# Patient Record
Sex: MALE | Race: WHITE
[De-identification: names, ages, dates, MRNs, and addresses within clinical notes are randomized per-mention and may not be internally consistent; named-entity substitution may affect disease eponyms.]

---

## 2021-12-23 ENCOUNTER — HOSPITAL ENCOUNTER (INPATIENT)
Dept: HOSPITAL 11 - JP.2SS | Age: 81
LOS: 8 days | Discharge: HOME | DRG: 293 | End: 2021-12-31
Attending: INTERNAL MEDICINE | Admitting: INTERNAL MEDICINE
Payer: MEDICARE

## 2021-12-23 DIAGNOSIS — Z79.899: ICD-10-CM

## 2021-12-23 DIAGNOSIS — I25.2: ICD-10-CM

## 2021-12-23 DIAGNOSIS — Z79.82: ICD-10-CM

## 2021-12-23 DIAGNOSIS — N19: ICD-10-CM

## 2021-12-23 DIAGNOSIS — H54.7: ICD-10-CM

## 2021-12-23 DIAGNOSIS — E78.5: ICD-10-CM

## 2021-12-23 DIAGNOSIS — Z89.511: ICD-10-CM

## 2021-12-23 DIAGNOSIS — I11.0: Primary | ICD-10-CM

## 2021-12-23 DIAGNOSIS — E11.9: ICD-10-CM

## 2021-12-23 DIAGNOSIS — Z87.01: ICD-10-CM

## 2021-12-23 DIAGNOSIS — Z20.822: ICD-10-CM

## 2021-12-23 DIAGNOSIS — I50.9: ICD-10-CM

## 2021-12-23 DIAGNOSIS — K59.09: ICD-10-CM

## 2021-12-23 DIAGNOSIS — I25.10: ICD-10-CM

## 2021-12-23 DIAGNOSIS — H91.90: ICD-10-CM

## 2021-12-23 DIAGNOSIS — Z87.891: ICD-10-CM

## 2021-12-23 DIAGNOSIS — I70.202: ICD-10-CM

## 2021-12-23 LAB — SARS-COV-2 RNA RESP QL NAA+PROBE: NEGATIVE

## 2021-12-23 RX ADMIN — GLIPIZIDE SCH: 5 TABLET, FILM COATED, EXTENDED RELEASE ORAL at 22:39

## 2021-12-23 RX ADMIN — GLIPIZIDE SCH MG: 5 TABLET, FILM COATED, EXTENDED RELEASE ORAL at 22:37

## 2021-12-23 NOTE — CRLCR
For Patients:  As a result of the 21st Century Cures Act, medical imaging 

exams and procedure reports are released immediately into your electronic 

medical record.  You may view this report before your referring provider.  

If you have questions, please contact your health care provider.



HISTORY:



Shortness of breath 



COMPARISON:



Chest two views from 10/07/2021 



FINDINGS:



A portable erect AP view of the chest was obtained at 21 29 hours. 



There is new moderate consolidation of the lateral right lower lung 

associated with a new mild right pleural effusion. The findings suggest new 

right lower lobe pneumonia.



There is new mild patchy density in the left lung base which is probably 

atelectasis, although additional pneumonia cannot be excluded.



There is a new tiny left pleural effusion.



The rest of the chest remains clear.



Shallow inspiration results in crowding of lung markings and the heart now 

appears to be mildly enlarged, partially the result of shallow inspiration. 

Again seen are sternal wires from median sternotomy. Again seen is a 

left-sided defibrillator with leads entering the left subclavian vein and 

terminating in the right atrium, right ventricle, and coronary sinus. 



The mediastinum otherwise remains normal in appearance. 



The osseous structures are otherwise normal in appearance for the patient`s 

age.



IMPRESSION:



New moderate consolidation of the right lateral lower lung with new mild 

right pleural effusion, worrisome for new right lower lobe pneumonia. 



Mild patchy infiltrates in the left lateral lower lung along with tiny left 

pleural effusion, probably atelectasis, but cannot exclude additional 

pneumonia. 



New mild cardiomegaly, at least partially the results of shallow 

inspiration.



Dictated by Blu Smith MD @ 12/23/2021 10:23:13 PM



(Electronically Signed)

## 2021-12-24 RX ADMIN — GLIPIZIDE SCH: 5 TABLET, FILM COATED, EXTENDED RELEASE ORAL at 16:18

## 2021-12-24 RX ADMIN — GLIPIZIDE SCH MG: 5 TABLET, FILM COATED, EXTENDED RELEASE ORAL at 07:34

## 2021-12-24 NOTE — PCM.HP.2
H&P History of Present Illness





- General


Date of Service: 12/24/21


Admit Problem/Dx: 


                           Admission Diagnosis/Problem





Admission Diagnosis/Problem      CHF, Congestive heart failure








Source of Information: Patient, Family


History Limitations: Reports: No Limitations





- History of Present Illness


Initial Comments - Free Text/Narative: 





He is having fluid retention gained about 40 pounds in 3 months.  Also has 

shortness of breath and orthopnea.  He has a pacemaker and recent replacement 

with a third wire as he is also in and out of AF


Duration of Symptoms: Reports: Chronic


Worsens with: Reports: Movement


Associated Symptoms: Reports: Shortness of Breath, Weakness


  ** Left Ankle


Pain Score (Numeric/FACES): 8





- Related Data


Allergies/Adverse Reactions: 


                                    Allergies











Allergy/AdvReac Type Severity Reaction Status Date / Time


 


Iodinated Contrast Media Allergy  Nausea Verified 12/23/21 21:01





[Iodinated Contrast Media -     





IV Dye]     











Home Medications: 


                                    Home Meds





Aspirin 325 mg PO DAILY 09/08/16 [History]


amLODIPine [Norvasc] 5 mg PO DAILY 09/08/16 [History]


atenoloL [Atenolol] 25 mg PO DAILY 09/08/16 [History]


glipiZIDE [Glucotrol XL] 10 mg PO BID 09/08/16 [History]


Lactobacillus Rhamnosus GG [Culturelle] 2 cap PO BID  cap 09/13/16 [Rx]


Furosemide [Lasix] 40 mg PO BID 12/23/21 [History]


Pantoprazole Sodium [Protonix] 40 mg PO BID 12/23/21 [History]


Spironolact/Hydrochlorothiazid [Spironolactone-Hctz 25-25 Tab] 1 tab PO DAILY 

12/23/21 [History]











Past Medical History


HEENT History: Reports: Hard of Hearing, Impaired Vision


Cardiovascular History: Reports: Heart Failure, Heart Murmur, Hypertension, MI


Respiratory History: Reports: Pneumonia, Recurrent


Gastrointestinal History: Reports: Chronic Constipation


Musculoskeletal History: Reports: Amputation


Endocrine/Metabolic History: Reports: Diabetes, Type II





- Infectious Disease History


Infectious Disease History: Reports: Chicken Pox, Hepatitis B, Measles, Mumps





- Past Surgical History


Head Surgeries/Procedures: Reports: None


HEENT Surgical History: Reports: Cataract Surgery


Cardiovascular Surgical History: Reports: Coronary Artery Bypass, Pacer, Other 

(See Below)


Other Cardiovascular Surgeries/Procedures: defibrilator


Respiratory Surgical History: Reports: None


GI Surgical History: Reports: None


Endocrine Surgical History: Reports: None


Neurological Surgical History: Reports: Other (See Below)


Other Neurological Surgeries/Procedures: lumber 4-5 "smashed" 1988


Musculoskeletal Surgical History: Reports: Amputation


Other Musculoskeletal Surgeries/Procedures:: left big toe





Social & Family History





- Family History


HEENT: Reports: Cataract, Impaired Vision


Cardiac: Reports: Hypertension, MI


Respiratory: Reports: None


GI: Reports: None


Musculoskeletal: Reports: Arthritis


Neurological: Reports: CVA


Endocrine/Metabolic: Reports: Diabetes, Type I, Diabetes, type II


Oncologic: Reports: Lung





- Tobacco Use


Tobacco Use Status *Q: Former Tobacco User


Years of Tobacco use: 50


Packs/Tins Daily: 1


Used Tobacco, but Quit: Yes


Month/Year Tobacco Last Used: 2009





- Caffeine Use


Caffeine Use: Reports: Coffee, Soda, Tea





- Recreational Drug Use


Recreational Drug Use: No





H&P Review of Systems





- Review of Systems:


Review Of Systems: See Below


General: Reports: Weakness


HEENT: Reports: No Symptoms


Pulmonary: Reports: Shortness of Breath, Cough


Cardiovascular: Reports: Palpitations, Dyspnea on Exertion, Orthopnea


Gastrointestinal: Reports: Constipation


Genitourinary: Reports: No Symptoms


Musculoskeletal: Reports: Leg Pain


Skin: Reports: Other (Pain left heel)


Neurological: Reports: Difficulty Walking, Weakness





Exam





- Exam


Exam: See Below





- Vital Signs


Vital Signs: 


                                Last Vital Signs











Temp  97.7 F   12/24/21 11:00


 


Pulse  104 H  12/24/21 11:00


 


Resp  18   12/24/21 11:00


 


BP  115/72   12/24/21 11:00


 


Pulse Ox  100   12/24/21 11:00











Weight: 198 lb 3.129 oz





- Exam


General: Alert, Oriented, Cooperative, Moderate Distress


HEENT: PERRLA, EACs Clear, Hearing Intact


Neck: Supple


Lungs: Decreased Breath Sounds, Rales


Cardiovascular: Regular Rate


GI/Abdominal Exam: Distended, Tender


Extremities: Other (BKA right red skin left leg without a papable pulse.)


Peripheral Pulses: 1+: Radial (L), Radial (R), Femoral (L), Femoral (R)


Skin: Warm


Neurological: Cranial Nerves Intact


Neuro Extensive - Mental Status: Alert, Oriented x3


DTR: 1+: Bicep (L), Bicep (R)


Psychiatric: Alert, Normal Affect





- Patient Data


Lab Results Last 24 hrs: 


                         Laboratory Results - last 24 hr











  12/23/21 12/23/21 12/23/21 Range/Units





  20:36 20:41 21:05 


 


WBC    5.5  (4.5-11.0)  K/uL


 


RBC    5.66  (4.30-5.90)  M/uL


 


Hgb    14.9  (12.0-15.0)  g/dL


 


Hct    47.2  (40.0-54.0)  %


 


MCV    83  (80-98)  fL


 


MCH    26 L  (27-31)  pg


 


MCHC    32  (32-36)  %


 


Plt Count    134 L  (150-400)  K/uL


 


Neut % (Auto)    78.8 H  (36-66)  %


 


Lymph % (Auto)    10.5 L  (24-44)  %


 


Mono % (Auto)    10.1 H  (2-6)  %


 


Eos % (Auto)    0.4 L  (2-4)  %


 


Baso % (Auto)    0.2  (0-1)  %


 


Sodium     (140-148)  mmol/L


 


Potassium     (3.6-5.2)  mmol/L


 


Chloride     (100-108)  mmol/L


 


Carbon Dioxide     (21-32)  mmol/L


 


Anion Gap     (5.0-14.0)  mmol/L


 


BUN     (7-18)  mg/dL


 


Creatinine     (0.8-1.3)  mg/dL


 


Est Cr Clr Drug Dosing     mL/min


 


Estimated GFR (MDRD)     (>60)  


 


Glucose     ()  mg/dL


 


POC Glucose     ()  mg/dL


 


Calcium     (8.5-10.1)  mg/dL


 


Total Bilirubin     (0.2-1.0)  mg/dL


 


AST     (15-37)  U/L


 


ALT     (12-78)  U/L


 


Alkaline Phosphatase     ()  U/L


 


NT-Pro-B Natriuret Pep     (5-450)  pg/mL


 


Total Protein     (6.4-8.2)  g/dL


 


Albumin     (3.4-5.0)  g/dL


 


Globulin     (2.3-3.5)  g/dL


 


Albumin/Globulin Ratio     (1.2-2.2)  


 


Urine Color   Yellow   (YELLOW)  


 


Urine Appearance   Clear   (CLEAR)  


 


Urine pH   5.0   (5.0-8.0)  


 


Ur Specific Gravity   1.020   (1.008-1.030)  


 


Urine Protein   Negative   (NEGATIVE)  mg/dL


 


Urine Glucose (UA)   Negative   (NEGATIVE)  mg/dL


 


Urine Ketones   Negative   (NEGATIVE)  mg/dL


 


Urine Occult Blood   Trace-lysed H   (NEGATIVE)  


 


Urine Nitrite   Negative   (NEGATIVE)  


 


Urine Bilirubin   Negative   (NEGATIVE)  


 


Urine Urobilinogen   0.2   (0.2-1.0)  EU/dL


 


Ur Leukocyte Esterase   Negative   (NEGATIVE)  


 


Urine RBC   0-5   (0-5)  


 


Urine WBC   0-5   (0-5)  


 


Ur Epithelial Cells   Rare   


 


Amorphous Sediment   Not seen   


 


Urine Bacteria   Few   


 


Urine Mucus   Few   


 


Urine Other      


 


Influenza Type A RNA  Negative    (NEGATIVE)  


 


RSV RNA (INAAT)  Negative    (NEGATIVE)  


 


Influenza Type B RNA  Negative    (NEGATIVE)  


 


SARS-CoV-2 RNA (STACY)  Negative    (NEGATIVE)  














  12/23/21 12/24/21 12/24/21 Range/Units





  21:05 04:09 13:47 


 


WBC     (4.5-11.0)  K/uL


 


RBC     (4.30-5.90)  M/uL


 


Hgb     (12.0-15.0)  g/dL


 


Hct     (40.0-54.0)  %


 


MCV     (80-98)  fL


 


MCH     (27-31)  pg


 


MCHC     (32-36)  %


 


Plt Count     (150-400)  K/uL


 


Neut % (Auto)     (36-66)  %


 


Lymph % (Auto)     (24-44)  %


 


Mono % (Auto)     (2-6)  %


 


Eos % (Auto)     (2-4)  %


 


Baso % (Auto)     (0-1)  %


 


Sodium  143  145   (140-148)  mmol/L


 


Potassium  4.1  3.8   (3.6-5.2)  mmol/L


 


Chloride  107  107   (100-108)  mmol/L


 


Carbon Dioxide  23  27   (21-32)  mmol/L


 


Anion Gap  12.7  10.8   (5.0-14.0)  mmol/L


 


BUN  18  17   (7-18)  mg/dL


 


Creatinine  1.4 H  1.4 H   (0.8-1.3)  mg/dL


 


Est Cr Clr Drug Dosing  43.45  43.45   mL/min


 


Estimated GFR (MDRD)  49 L  49 L   (>60)  


 


Glucose  136 H  110 H   ()  mg/dL


 


POC Glucose    97  ()  mg/dL


 


Calcium  8.9  8.7   (8.5-10.1)  mg/dL


 


Total Bilirubin  1.1 H D    (0.2-1.0)  mg/dL


 


AST  24    (15-37)  U/L


 


ALT  21    (12-78)  U/L


 


Alkaline Phosphatase  85    ()  U/L


 


NT-Pro-B Natriuret Pep  35913 H    (5-450)  pg/mL


 


Total Protein  6.4    (6.4-8.2)  g/dL


 


Albumin  3.4    (3.4-5.0)  g/dL


 


Globulin  3.0    (2.3-3.5)  g/dL


 


Albumin/Globulin Ratio  1.1 L    (1.2-2.2)  


 


Urine Color     (YELLOW)  


 


Urine Appearance     (CLEAR)  


 


Urine pH     (5.0-8.0)  


 


Ur Specific Gravity     (1.008-1.030)  


 


Urine Protein     (NEGATIVE)  mg/dL


 


Urine Glucose (UA)     (NEGATIVE)  mg/dL


 


Urine Ketones     (NEGATIVE)  mg/dL


 


Urine Occult Blood     (NEGATIVE)  


 


Urine Nitrite     (NEGATIVE)  


 


Urine Bilirubin     (NEGATIVE)  


 


Urine Urobilinogen     (0.2-1.0)  EU/dL


 


Ur Leukocyte Esterase     (NEGATIVE)  


 


Urine RBC     (0-5)  


 


Urine WBC     (0-5)  


 


Ur Epithelial Cells     


 


Amorphous Sediment     


 


Urine Bacteria     


 


Urine Mucus     


 


Urine Other     


 


Influenza Type A RNA     (NEGATIVE)  


 


RSV RNA (INAAT)     (NEGATIVE)  


 


Influenza Type B RNA     (NEGATIVE)  


 


SARS-CoV-2 RNA (STACY)     (NEGATIVE)  











Result Diagrams: 


                                 12/23/21 21:05





                                 12/24/21 04:09





Sepsis Event Note





- Evaluation


Sepsis Screening Result: No Definite Risk





- Focused Exam


Vital Signs: 


                                   Vital Signs











  Temp Pulse Pulse Resp BP BP Pulse Ox


 


 12/24/21 11:00  97.7 F   104 H  18   115/72  100


 


 12/24/21 10:35   106 H    109/65  


 


 12/24/21 07:34  96.9 F   98  18   119/65  97


 


 12/24/21 03:00    93  18   104/77  98











Problem List Initiated/Reviewed/Updated: Yes


Orders Last 24hrs: 


                               Active Orders 24 hr











 Category Date Time Status


 


 Patient Status [ADT] Routine ADT  12/23/21 20:21 Active


 


 Communication Order [RC] ASDIRECTED Care  12/23/21 23:49 Active


 


 Raygoza Catheter Insertion [Insert Urinary Catheter] [OM. Care  12/23/21 21:15 

Ordered





 PC] Q24H   


 


 POC Glucose [Blood Glucose Check, Bedside] [RC] Care  12/24/21 10:20 Active





 WITHMEALSANDBED   


 


 Telemetry Monitoring [Cardiac Monitoring] [RC] .As Care  12/23/21 22:38 Active





 Directed   


 


 Urinary Catheter Assessment [RC] ASDIRECTED Care  12/23/21 21:15 Active


 


 Consistent Carbohydrate Diet [DIET] Diet  12/24/21 Breakfast Active


 


 CULTURE RESPIRATORY + SMEAR [RM] Routine Lab  12/23/21 23:49 Ordered


 


 GLUCOSE POC LAB TO COLLECT JPM [POC] QIDACANDBED Lab  12/24/21 16:30 Ordered


 


 GLUCOSE POC LAB TO COLLECT JPM [POC] QIDACANDBED Lab  12/24/21 21:00 Ordered


 


 GLUCOSE POC LAB TO COLLECT JPM [POC] QIDACANDBED Lab  12/25/21 07:30 Ordered


 


 GLUCOSE POC LAB TO COLLECT JPM [POC] QIDACANDBED Lab  12/25/21 11:30 Ordered


 


 GLUCOSE POC LAB TO COLLECT JPM [POC] QIDACANDBED Lab  12/25/21 16:30 Ordered


 


 GLUCOSE POC LAB TO COLLECT JPM [POC] QIDACANDBED Lab  12/25/21 21:00 Ordered


 


 GLUCOSE POC LAB TO COLLECT JPM [POC] QIDACANDBED Lab  12/26/21 07:30 Ordered


 


 GLUCOSE POC LAB TO COLLECT JPM [POC] QIDACANDBED Lab  12/26/21 11:30 Ordered


 


 GLUCOSE POC LAB TO COLLECT JPM [POC] QIDACANDBED Lab  12/26/21 16:30 Ordered


 


 GLUCOSE POC LAB TO COLLECT JPM [POC] QIDACANDBED Lab  12/26/21 21:00 Ordered


 


 GLUCOSE POC LAB TO COLLECT JPM [POC] QIDACANDBED Lab  12/27/21 07:30 Ordered


 


 GLUCOSE POC LAB TO COLLECT JPM [POC] QIDACANDBED Lab  12/27/21 11:30 Ordered


 


 GLUCOSE POC LAB TO COLLECT JPM [POC] QIDACANDBED Lab  12/27/21 16:30 Ordered


 


 GLUCOSE POC LAB TO COLLECT JPM [POC] QIDACANDBED Lab  12/27/21 21:00 Ordered


 


 GLUCOSE POC LAB TO COLLECT JPM [POC] QIDACANDBED Lab  12/28/21 07:30 Ordered


 


 GLUCOSE POC LAB TO COLLECT JPM [POC] QIDACANDBED Lab  12/28/21 11:30 Ordered


 


 Aspirin [Halfprin] Med  12/24/21 09:00 Active





 81 mg PO DAILY   


 


 Furosemide [Lasix] Med  12/24/21 12:00 Active





 20 mg IVPUSH Q6H   


 


 Pantoprazole [ProTONIX***] Med  12/24/21 07:30 Active





 40 mg PO BIDAC   


 


 Sodium Chloride 0.9% [Saline Flush] Med  12/23/21 20:41 Active





 10 ml FLUSH ASDIRECTED PRN   


 


 Spironolactone [Aldactone] Med  12/23/21 22:15 Active





 25 mg PO DAILY   


 


 atenoloL [Tenormin] Med  12/24/21 10:30 Active





 25 mg PO DAILY   


 


 diphenhydrAMINE [Benadryl] Med  12/24/21 20:00 Once





 25 mg IVPUSH ONETIME ONE   


 


 diphenhydrAMINE [Benadryl] Med  12/25/21 08:00 Once





 25 mg IVPUSH ONETIME ONE   


 


 glipiZIDE [Glucotrol XL] Med  12/23/21 22:00 Active





 10 mg PO BIDMEALS   


 


 hydroCHLOROthiazide Med  12/23/21 22:15 Active





 25 mg PO DAILY   


 


 methylPREDNISolone Sod Succ [Solu-MEDROL] Med  12/24/21 20:00 Once





 80 mg IVPUSH ONETIME ONE   


 


 methylPREDNISolone Sod Succ [Solu-MEDROL] Med  12/25/21 08:00 Once





 80 mg IVPUSH ONETIME ONE   


 


 traMADol [Ultram] Med  12/24/21 10:21 Active





 50 mg PO Q6H PRN   


 


 Saline Lock Insert [OM.PC] Routine Oth  12/23/21 20:41 Ordered


 


 EKG 12 Lead [EK] Routine Ther  12/23/21 20:41 Ordered








                                Medication Orders





Aspirin (Aspirin 81 Mg Tab.Ec)  81 mg PO DAILY [facility]


   Last Admin: 12/24/21 08:26  Dose: 81 mg


   Documented by: BRIGHT


Atenolol (Atenolol 25 Mg Tab)  25 mg PO DAILY [facility]


   Last Admin: 12/24/21 10:35  Dose: 25 mg


   Documented by: BRIGHT


Diphenhydramine HCl (Diphenhydramine 50 Mg/Ml Sdv)  25 mg IVPUSH ONETIME ONE


   Stop: 12/24/21 20:01


Diphenhydramine HCl (Diphenhydramine 50 Mg/Ml Sdv)  25 mg IVPUSH ONETIME ONE


   Stop: 12/25/21 08:01


Furosemide (Furosemide 20 Mg/2 Ml Vial)  20 mg IVPUSH Q6H [facility]


   Last Admin: 12/24/21 13:19  Dose: 20 mg


   Documented by: BRIGHT


Glipizide (Glipizide 5 Mg Tab.Er)  10 mg PO BIDMEALS [facility]


   Last Admin: 12/24/21 07:34  Dose: 10 mg


   Documented by: BRIGHT


   Admin: 12/23/21 22:39 Dose:  Not Given


   Documented by: MANPREET


Hydrochlorothiazide (Hydrochlorothiazide 25 Mg Tab)  25 mg PO DAILY [facility]


   Last Admin: 12/24/21 08:26  Dose: 25 mg


   Documented by: BRIGHT


   Admin: 12/23/21 22:37  Dose: 25 mg


   Documented by: MANPREET


Methylprednisolone Sodium Succinate (Methylprednisolone Sodium Succinate 40 Mg/1

 Ml Sdv)  80 mg IVPUSH ONETIME ONE


   Stop: 12/24/21 20:01


Methylprednisolone Sodium Succinate (Methylprednisolone Sodium Succinate 40 Mg/1

 Ml Sdv)  80 mg IVPUSH ONETIME ONE


   Stop: 12/25/21 08:01


Pantoprazole Sodium (Pantoprazole 40 Mg Tab.Cr)  40 mg PO BIDAC [facility]


   Last Admin: 12/24/21 07:45  Dose: 40 mg


   Documented by: BRIGHT


Sodium Chloride (Sodium Chloride 0.9% 10 Ml Syringe)  10 ml FLUSH ASDIRECTED PRN


   PRN Reason: Keep Vein Open


Spironolactone (Spironolactone 25 Mg Tab)  25 mg PO DAILY [facility]


   Last Admin: 12/24/21 08:27  Dose: 25 mg


   Documented by: BRIGHT


   Admin: 12/23/21 22:37  Dose: 25 mg


   Documented by: MANPREET


Tramadol HCl (Tramadol 50 Mg Tab)  50 mg PO Q6H PRN


   PRN Reason: Pain


   Last Admin: 12/24/21 10:35  Dose: 50 mg


   Documented by: BRIGHT








Assessment/Plan Comment:: 





Assessment/Plan:  #1.  CHF with fluid retention.  Have started of IV Lasix and 

Zaroxolyn and HCTZ and Aldactazide.  Will have the pacemaker checked in the 

morning.  Will schedule a echocardiogram when possible.





#2.  DMII.  Will adjust and treat blood sugars





#3.  Arthrosclerosis arteries diffuse.  Will check arterial flow in the left 

leg.





#4.  ASHD:  Chronic





#5.  BKA right leg.]





#6.  History of HTN:





#7.  History of HLD











- Mortality Measure


Prognosis:: Poor

## 2021-12-24 NOTE — US
VL Duplex Lwr Ext Art Ltd Lt

 

CLINICAL HISTORY: Left leg pain and swelling

 

FINDINGS: Real-time Doppler images were obtained to left lower extremity from

the groin to the foot

Monophasic waveforms are seen from the common femoral artery downward through

the superficial femoral artery popliteal artery and the anterior and posterior

tibial arteries. Monophasic reduced flow is seen in the dorsal pedal artery.

Distal arteries were very calcified

 

 

IMPRESSION: Monophasic waveforms through the left lower extremity consistent

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

 

Diffuse arterial calcification consistent with patient's history of diabetes

## 2021-12-24 NOTE — PCM.PN
- General Info


Date of Service: 12/24/21


Functional Status: Reports: Pain Controlled





- Review of Systems


General: Reports: Weakness


Pulmonary: Reports: Shortness of Breath


Cardiovascular: Reports: No Symptoms


Gastrointestinal: Reports: Constipation


Genitourinary: Reports: No Symptoms


Skin: Reports: Other (pain left heel)


Psychiatric: Reports: No Symptoms





- Patient Data


Vitals - Most Recent: 


                                Last Vital Signs











Temp  97.7 F   12/24/21 11:00


 


Pulse  104 H  12/24/21 11:00


 


Resp  18   12/24/21 11:00


 


BP  115/72   12/24/21 11:00


 


Pulse Ox  100   12/24/21 11:00











Weight - Most Recent: 198 lb 3.129 oz


I&O - Last 24 Hours: 


                                 Intake & Output











 12/23/21 12/24/21 12/24/21





 22:59 06:59 14:59


 


Intake Total   540


 


Output Total  1550 575


 


Balance  -1550 -35











Lab Results Last 24 Hours: 


                         Laboratory Results - last 24 hr











  12/23/21 12/23/21 12/23/21 Range/Units





  20:36 20:41 21:05 


 


WBC    5.5  (4.5-11.0)  K/uL


 


RBC    5.66  (4.30-5.90)  M/uL


 


Hgb    14.9  (12.0-15.0)  g/dL


 


Hct    47.2  (40.0-54.0)  %


 


MCV    83  (80-98)  fL


 


MCH    26 L  (27-31)  pg


 


MCHC    32  (32-36)  %


 


Plt Count    134 L  (150-400)  K/uL


 


Neut % (Auto)    78.8 H  (36-66)  %


 


Lymph % (Auto)    10.5 L  (24-44)  %


 


Mono % (Auto)    10.1 H  (2-6)  %


 


Eos % (Auto)    0.4 L  (2-4)  %


 


Baso % (Auto)    0.2  (0-1)  %


 


Sodium     (140-148)  mmol/L


 


Potassium     (3.6-5.2)  mmol/L


 


Chloride     (100-108)  mmol/L


 


Carbon Dioxide     (21-32)  mmol/L


 


Anion Gap     (5.0-14.0)  mmol/L


 


BUN     (7-18)  mg/dL


 


Creatinine     (0.8-1.3)  mg/dL


 


Est Cr Clr Drug Dosing     mL/min


 


Estimated GFR (MDRD)     (>60)  


 


Glucose     ()  mg/dL


 


POC Glucose     ()  mg/dL


 


Calcium     (8.5-10.1)  mg/dL


 


Total Bilirubin     (0.2-1.0)  mg/dL


 


AST     (15-37)  U/L


 


ALT     (12-78)  U/L


 


Alkaline Phosphatase     ()  U/L


 


NT-Pro-B Natriuret Pep     (5-450)  pg/mL


 


Total Protein     (6.4-8.2)  g/dL


 


Albumin     (3.4-5.0)  g/dL


 


Globulin     (2.3-3.5)  g/dL


 


Albumin/Globulin Ratio     (1.2-2.2)  


 


Urine Color   Yellow   (YELLOW)  


 


Urine Appearance   Clear   (CLEAR)  


 


Urine pH   5.0   (5.0-8.0)  


 


Ur Specific Gravity   1.020   (1.008-1.030)  


 


Urine Protein   Negative   (NEGATIVE)  mg/dL


 


Urine Glucose (UA)   Negative   (NEGATIVE)  mg/dL


 


Urine Ketones   Negative   (NEGATIVE)  mg/dL


 


Urine Occult Blood   Trace-lysed H   (NEGATIVE)  


 


Urine Nitrite   Negative   (NEGATIVE)  


 


Urine Bilirubin   Negative   (NEGATIVE)  


 


Urine Urobilinogen   0.2   (0.2-1.0)  EU/dL


 


Ur Leukocyte Esterase   Negative   (NEGATIVE)  


 


Urine RBC   0-5   (0-5)  


 


Urine WBC   0-5   (0-5)  


 


Ur Epithelial Cells   Rare   


 


Amorphous Sediment   Not seen   


 


Urine Bacteria   Few   


 


Urine Mucus   Few   


 


Urine Other      


 


Influenza Type A RNA  Negative    (NEGATIVE)  


 


RSV RNA (INAAT)  Negative    (NEGATIVE)  


 


Influenza Type B RNA  Negative    (NEGATIVE)  


 


SARS-CoV-2 RNA (STACY)  Negative    (NEGATIVE)  














  12/23/21 12/24/21 12/24/21 Range/Units





  21:05 04:09 13:47 


 


WBC     (4.5-11.0)  K/uL


 


RBC     (4.30-5.90)  M/uL


 


Hgb     (12.0-15.0)  g/dL


 


Hct     (40.0-54.0)  %


 


MCV     (80-98)  fL


 


MCH     (27-31)  pg


 


MCHC     (32-36)  %


 


Plt Count     (150-400)  K/uL


 


Neut % (Auto)     (36-66)  %


 


Lymph % (Auto)     (24-44)  %


 


Mono % (Auto)     (2-6)  %


 


Eos % (Auto)     (2-4)  %


 


Baso % (Auto)     (0-1)  %


 


Sodium  143  145   (140-148)  mmol/L


 


Potassium  4.1  3.8   (3.6-5.2)  mmol/L


 


Chloride  107  107   (100-108)  mmol/L


 


Carbon Dioxide  23  27   (21-32)  mmol/L


 


Anion Gap  12.7  10.8   (5.0-14.0)  mmol/L


 


BUN  18  17   (7-18)  mg/dL


 


Creatinine  1.4 H  1.4 H   (0.8-1.3)  mg/dL


 


Est Cr Clr Drug Dosing  43.45  43.45   mL/min


 


Estimated GFR (MDRD)  49 L  49 L   (>60)  


 


Glucose  136 H  110 H   ()  mg/dL


 


POC Glucose    97  ()  mg/dL


 


Calcium  8.9  8.7   (8.5-10.1)  mg/dL


 


Total Bilirubin  1.1 H D    (0.2-1.0)  mg/dL


 


AST  24    (15-37)  U/L


 


ALT  21    (12-78)  U/L


 


Alkaline Phosphatase  85    ()  U/L


 


NT-Pro-B Natriuret Pep  64503 H    (5-450)  pg/mL


 


Total Protein  6.4    (6.4-8.2)  g/dL


 


Albumin  3.4    (3.4-5.0)  g/dL


 


Globulin  3.0    (2.3-3.5)  g/dL


 


Albumin/Globulin Ratio  1.1 L    (1.2-2.2)  


 


Urine Color     (YELLOW)  


 


Urine Appearance     (CLEAR)  


 


Urine pH     (5.0-8.0)  


 


Ur Specific Gravity     (1.008-1.030)  


 


Urine Protein     (NEGATIVE)  mg/dL


 


Urine Glucose (UA)     (NEGATIVE)  mg/dL


 


Urine Ketones     (NEGATIVE)  mg/dL


 


Urine Occult Blood     (NEGATIVE)  


 


Urine Nitrite     (NEGATIVE)  


 


Urine Bilirubin     (NEGATIVE)  


 


Urine Urobilinogen     (0.2-1.0)  EU/dL


 


Ur Leukocyte Esterase     (NEGATIVE)  


 


Urine RBC     (0-5)  


 


Urine WBC     (0-5)  


 


Ur Epithelial Cells     


 


Amorphous Sediment     


 


Urine Bacteria     


 


Urine Mucus     


 


Urine Other     


 


Influenza Type A RNA     (NEGATIVE)  


 


RSV RNA (INAAT)     (NEGATIVE)  


 


Influenza Type B RNA     (NEGATIVE)  


 


SARS-CoV-2 RNA (STACY)     (NEGATIVE)  











Med Orders - Current: 


                               Current Medications





Aspirin (Aspirin 81 Mg Tab.Ec)  81 mg PO DAILY [facility]


   Last Admin: 12/24/21 08:26 Dose:  81 mg


   Documented by: 


Atenolol (Atenolol 25 Mg Tab)  25 mg PO DAILY [facility]


   Last Admin: 12/24/21 10:35 Dose:  25 mg


   Documented by: 


Diphenhydramine HCl (Diphenhydramine 50 Mg/Ml Sdv)  25 mg IVPUSH ONETIME ONE


   Stop: 12/24/21 20:01


Diphenhydramine HCl (Diphenhydramine 50 Mg/Ml Sdv)  25 mg IVPUSH ONETIME ONE


   Stop: 12/25/21 08:01


Furosemide (Furosemide 20 Mg/2 Ml Vial)  20 mg IVPUSH Q6H [facility]


   Last Admin: 12/24/21 13:19 Dose:  20 mg


   Documented by: 


Glipizide (Glipizide 5 Mg Tab.Er)  10 mg PO BIDMEALS [facility]


   Last Admin: 12/24/21 07:34 Dose:  10 mg


   Documented by: 


Hydrochlorothiazide (Hydrochlorothiazide 25 Mg Tab)  25 mg PO DAILY [facility]


   Last Admin: 12/24/21 08:26 Dose:  25 mg


   Documented by: 


Methylprednisolone Sodium Succinate (Methylprednisolone Sodium Succinate 40 Mg/1

Ml Sdv)  80 mg IVPUSH ONETIME ONE


   Stop: 12/24/21 20:01


Methylprednisolone Sodium Succinate (Methylprednisolone Sodium Succinate 40 Mg/1

Ml Sdv)  80 mg IVPUSH ONETIME ONE


   Stop: 12/25/21 08:01


Pantoprazole Sodium (Pantoprazole 40 Mg Tab.Cr)  40 mg PO BIDAC [facility]


   Last Admin: 12/24/21 07:45 Dose:  40 mg


   Documented by: 


Sodium Chloride (Sodium Chloride 0.9% 10 Ml Syringe)  10 ml FLUSH ASDIRECTED PRN


   PRN Reason: Keep Vein Open


Spironolactone (Spironolactone 25 Mg Tab)  25 mg PO DAILY [facility]


   Last Admin: 12/24/21 08:27 Dose:  25 mg


   Documented by: 


Tramadol HCl (Tramadol 50 Mg Tab)  50 mg PO Q6H PRN


   PRN Reason: Pain


   Last Admin: 12/24/21 10:35 Dose:  50 mg


   Documented by: 





Discontinued Medications





Furosemide (Furosemide 20 Mg/2 Ml Vial)  20 mg IVPUSH ONETIME ONE


   Stop: 12/23/21 21:11


   Last Admin: 12/23/21 22:01 Dose:  20 mg


   Documented by: 


Lidocaine HCl (Lidocaine 2% Jelly 10 Ml Urojet)  10 ml MUCMEM ONETIME ONE


   Stop: 12/23/21 21:48


   Last Admin: 12/23/21 22:01 Dose:  10 ml


   Documented by: 


Metolazone (Metolazone 2.5 Mg Tab)  5 mg PO ONETIME ONE


   Stop: 12/23/21 21:49


   Last Admin: 12/23/21 22:37 Dose:  5 mg


   Documented by: 











- Exam


Urinary Catheter Total Time: 0Days  0Hours


General: Alert, Oriented


HEENT: Pupils Equal, Pupils Reactive, EOMI, Mucous Membr. Moist/Pink


Neck: Supple


Lungs: Normal Respiratory Effort, Crackles, Rales


Cardiovascular: Regular Rate, Regular Rhythm


GI/Abdominal Exam: Distended


Back Exam: Normal Inspection, Full Range of Motion


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


Skin: Warm


Psy/Mental Status: Alert, Normal Affect, Normal Mood





- Patient Data


Lab Results Last 24 hrs: 


                         Laboratory Results - last 24 hr











  12/23/21 12/23/21 12/23/21 Range/Units





  20:36 20:41 21:05 


 


WBC    5.5  (4.5-11.0)  K/uL


 


RBC    5.66  (4.30-5.90)  M/uL


 


Hgb    14.9  (12.0-15.0)  g/dL


 


Hct    47.2  (40.0-54.0)  %


 


MCV    83  (80-98)  fL


 


MCH    26 L  (27-31)  pg


 


MCHC    32  (32-36)  %


 


Plt Count    134 L  (150-400)  K/uL


 


Neut % (Auto)    78.8 H  (36-66)  %


 


Lymph % (Auto)    10.5 L  (24-44)  %


 


Mono % (Auto)    10.1 H  (2-6)  %


 


Eos % (Auto)    0.4 L  (2-4)  %


 


Baso % (Auto)    0.2  (0-1)  %


 


Sodium     (140-148)  mmol/L


 


Potassium     (3.6-5.2)  mmol/L


 


Chloride     (100-108)  mmol/L


 


Carbon Dioxide     (21-32)  mmol/L


 


Anion Gap     (5.0-14.0)  mmol/L


 


BUN     (7-18)  mg/dL


 


Creatinine     (0.8-1.3)  mg/dL


 


Est Cr Clr Drug Dosing     mL/min


 


Estimated GFR (MDRD)     (>60)  


 


Glucose     ()  mg/dL


 


POC Glucose     ()  mg/dL


 


Calcium     (8.5-10.1)  mg/dL


 


Total Bilirubin     (0.2-1.0)  mg/dL


 


AST     (15-37)  U/L


 


ALT     (12-78)  U/L


 


Alkaline Phosphatase     ()  U/L


 


NT-Pro-B Natriuret Pep     (5-450)  pg/mL


 


Total Protein     (6.4-8.2)  g/dL


 


Albumin     (3.4-5.0)  g/dL


 


Globulin     (2.3-3.5)  g/dL


 


Albumin/Globulin Ratio     (1.2-2.2)  


 


Urine Color   Yellow   (YELLOW)  


 


Urine Appearance   Clear   (CLEAR)  


 


Urine pH   5.0   (5.0-8.0)  


 


Ur Specific Gravity   1.020   (1.008-1.030)  


 


Urine Protein   Negative   (NEGATIVE)  mg/dL


 


Urine Glucose (UA)   Negative   (NEGATIVE)  mg/dL


 


Urine Ketones   Negative   (NEGATIVE)  mg/dL


 


Urine Occult Blood   Trace-lysed H   (NEGATIVE)  


 


Urine Nitrite   Negative   (NEGATIVE)  


 


Urine Bilirubin   Negative   (NEGATIVE)  


 


Urine Urobilinogen   0.2   (0.2-1.0)  EU/dL


 


Ur Leukocyte Esterase   Negative   (NEGATIVE)  


 


Urine RBC   0-5   (0-5)  


 


Urine WBC   0-5   (0-5)  


 


Ur Epithelial Cells   Rare   


 


Amorphous Sediment   Not seen   


 


Urine Bacteria   Few   


 


Urine Mucus   Few   


 


Urine Other      


 


Influenza Type A RNA  Negative    (NEGATIVE)  


 


RSV RNA (INAAT)  Negative    (NEGATIVE)  


 


Influenza Type B RNA  Negative    (NEGATIVE)  


 


SARS-CoV-2 RNA (STACY)  Negative    (NEGATIVE)  














  12/23/21 12/24/21 12/24/21 Range/Units





  21:05 04:09 13:47 


 


WBC     (4.5-11.0)  K/uL


 


RBC     (4.30-5.90)  M/uL


 


Hgb     (12.0-15.0)  g/dL


 


Hct     (40.0-54.0)  %


 


MCV     (80-98)  fL


 


MCH     (27-31)  pg


 


MCHC     (32-36)  %


 


Plt Count     (150-400)  K/uL


 


Neut % (Auto)     (36-66)  %


 


Lymph % (Auto)     (24-44)  %


 


Mono % (Auto)     (2-6)  %


 


Eos % (Auto)     (2-4)  %


 


Baso % (Auto)     (0-1)  %


 


Sodium  143  145   (140-148)  mmol/L


 


Potassium  4.1  3.8   (3.6-5.2)  mmol/L


 


Chloride  107  107   (100-108)  mmol/L


 


Carbon Dioxide  23  27   (21-32)  mmol/L


 


Anion Gap  12.7  10.8   (5.0-14.0)  mmol/L


 


BUN  18  17   (7-18)  mg/dL


 


Creatinine  1.4 H  1.4 H   (0.8-1.3)  mg/dL


 


Est Cr Clr Drug Dosing  43.45  43.45   mL/min


 


Estimated GFR (MDRD)  49 L  49 L   (>60)  


 


Glucose  136 H  110 H   ()  mg/dL


 


POC Glucose    97  ()  mg/dL


 


Calcium  8.9  8.7   (8.5-10.1)  mg/dL


 


Total Bilirubin  1.1 H D    (0.2-1.0)  mg/dL


 


AST  24    (15-37)  U/L


 


ALT  21    (12-78)  U/L


 


Alkaline Phosphatase  85    ()  U/L


 


NT-Pro-B Natriuret Pep  44967 H    (5-450)  pg/mL


 


Total Protein  6.4    (6.4-8.2)  g/dL


 


Albumin  3.4    (3.4-5.0)  g/dL


 


Globulin  3.0    (2.3-3.5)  g/dL


 


Albumin/Globulin Ratio  1.1 L    (1.2-2.2)  


 


Urine Color     (YELLOW)  


 


Urine Appearance     (CLEAR)  


 


Urine pH     (5.0-8.0)  


 


Ur Specific Gravity     (1.008-1.030)  


 


Urine Protein     (NEGATIVE)  mg/dL


 


Urine Glucose (UA)     (NEGATIVE)  mg/dL


 


Urine Ketones     (NEGATIVE)  mg/dL


 


Urine Occult Blood     (NEGATIVE)  


 


Urine Nitrite     (NEGATIVE)  


 


Urine Bilirubin     (NEGATIVE)  


 


Urine Urobilinogen     (0.2-1.0)  EU/dL


 


Ur Leukocyte Esterase     (NEGATIVE)  


 


Urine RBC     (0-5)  


 


Urine WBC     (0-5)  


 


Ur Epithelial Cells     


 


Amorphous Sediment     


 


Urine Bacteria     


 


Urine Mucus     


 


Urine Other     


 


Influenza Type A RNA     (NEGATIVE)  


 


RSV RNA (INAAT)     (NEGATIVE)  


 


Influenza Type B RNA     (NEGATIVE)  


 


SARS-CoV-2 RNA (STACY)     (NEGATIVE)  











Result Diagrams: 


                                 12/23/21 21:05





                                 12/24/21 04:09





Sepsis Event Note





- Evaluation


Sepsis Screening Result: No Definite Risk





- Focused Exam


Vital Signs: 


                                   Vital Signs











  Temp Pulse Pulse Resp BP BP Pulse Ox


 


 12/24/21 11:00  97.7 F   104 H  18   115/72  100


 


 12/24/21 10:35   106 H    109/65  


 


 12/24/21 07:34  96.9 F   98  18   119/65  97


 


 12/24/21 03:00    93  18   104/77  98














- Problem List Review


Problem List Initiated/Reviewed/Updated: Yes





- My Orders


Last 24 Hours: 


My Active Orders





12/23/21 20:21


Patient Status [ADT] Routine 





12/23/21 20:41


Sodium Chloride 0.9% [Saline Flush]   10 ml FLUSH ASDIRECTED PRN 


Saline Lock Insert [OM.PC] Routine 


EKG 12 Lead [EK] Routine 





12/23/21 21:15


Raygoza Catheter Insertion [Insert Urinary Catheter] [OM.PC] Q24H 


Urinary Catheter Assessment [RC] ASDIRECTED 





12/23/21 22:00


glipiZIDE [Glucotrol XL]   10 mg PO BIDMEALS 





12/23/21 22:15


Spironolactone [Aldactone]   25 mg PO DAILY 


hydroCHLOROthiazide   25 mg PO DAILY 





12/23/21 22:38


Telemetry Monitoring [Cardiac Monitoring] [RC] .As Directed 





12/23/21 23:49


Communication Order [RC] ASDIRECTED 


CULTURE RESPIRATORY + SMEAR [RM] Routine 





12/24/21 07:30


Pantoprazole [ProTONIX***]   40 mg PO BIDAC 





12/24/21 Breakfast


Consistent Carbohydrate Diet [DIET] 





12/24/21 09:00


Aspirin [Halfprin]   81 mg PO DAILY 





12/24/21 10:20


POC Glucose [Blood Glucose Check, Bedside] [RC] WITHMEALSANDBED 





12/24/21 10:21


traMADol [Ultram]   50 mg PO Q6H PRN 





12/24/21 10:30


atenoloL [Tenormin]   25 mg PO DAILY 





12/24/21 12:00


Furosemide [Lasix]   20 mg IVPUSH Q6H 





12/24/21 16:30


GLUCOSE POC LAB TO COLLECT JPM [POC] QIDACANDBED 





12/24/21 20:00


diphenhydrAMINE [Benadryl]   25 mg IVPUSH ONETIME ONE 


methylPREDNISolone Sod Succ [Solu-MEDROL]   80 mg IVPUSH ONETIME ONE 





12/24/21 21:00


GLUCOSE POC LAB TO COLLECT JPM [POC] QIDACANDBED 





12/25/21 07:30


GLUCOSE POC LAB TO COLLECT JPM [POC] QIDACANDBED 





12/25/21 08:00


diphenhydrAMINE [Benadryl]   25 mg IVPUSH ONETIME ONE 


methylPREDNISolone Sod Succ [Solu-MEDROL]   80 mg IVPUSH ONETIME ONE 





12/25/21 11:30


GLUCOSE POC LAB TO COLLECT JPM [POC] QIDACANDBED 





12/25/21 16:30


GLUCOSE POC LAB TO COLLECT JPM [POC] QIDACANDBED 





12/25/21 21:00


GLUCOSE POC LAB TO COLLECT JPM [POC] QIDACANDBED 





12/26/21 07:30


GLUCOSE POC LAB TO COLLECT JPM [POC] QIDACANDBED 





12/26/21 11:30


GLUCOSE POC LAB TO COLLECT JPM [POC] QIDACANDBED 





12/26/21 16:30


GLUCOSE POC LAB TO COLLECT JPM [POC] QIDACANDBED 





12/26/21 21:00


GLUCOSE POC LAB TO COLLECT JPM [POC] QIDACANDBED 





12/27/21 07:30


GLUCOSE POC LAB TO COLLECT JPM [POC] QIDACANDBED 





12/27/21 11:30


GLUCOSE POC LAB TO COLLECT JPM [POC] QIDACANDBED 





12/27/21 16:30


GLUCOSE POC LAB TO COLLECT JPM [POC] QIDACANDBED 





12/27/21 21:00


GLUCOSE POC LAB TO COLLECT JPM [POC] QIDACANDBED 





12/28/21 07:30


GLUCOSE POC LAB TO COLLECT JPM [POC] QIDACANDBED 





12/28/21 11:30


GLUCOSE POC LAB TO COLLECT JPM [POC] QIDACANDBED 














- Plan


Plan:: 





Assessment/Plan:  #1.  CHF with fluid retention.  will continue with IV Lasix 

and Zaroxolyn and HCTZ and Aldactazide. Will schedule a echocardiogram when 

possible.  He put out 1.5 liters almost immediately last night.  Pacemaker was 

adjusted today.





#2.  DMII.  Will adjust and treat blood sugars





#3.  Arthrosclerosis arteries diffuse.  Will check arterial flow in the left 

leg.





#4.  ASHD:  Chronic





#5.  BKA right leg.]





#6.  History of HTN:





#7.  History of HLD

## 2021-12-25 RX ADMIN — GLIPIZIDE SCH: 5 TABLET, FILM COATED, EXTENDED RELEASE ORAL at 11:11

## 2021-12-25 RX ADMIN — GLIPIZIDE SCH MG: 5 TABLET, FILM COATED, EXTENDED RELEASE ORAL at 13:09

## 2021-12-25 RX ADMIN — GLIPIZIDE SCH MG: 5 TABLET, FILM COATED, EXTENDED RELEASE ORAL at 16:55

## 2021-12-25 NOTE — PCM.PN
- General Info


Date of Service: 12/25/21





- Review of Systems


General: Reports: Weakness


HEENT: Reports: No Symptoms


Pulmonary: Reports: Shortness of Breath, Cough


Cardiovascular: Reports: Dyspnea on Exertion


Gastrointestinal: Reports: No Symptoms


Genitourinary: Reports: No Symptoms


Skin: Reports: No Symptoms


Neurological: Reports: No Symptoms


Psychiatric: Reports: No Symptoms





- Patient Data


Vitals - Most Recent: 


                                Last Vital Signs











Temp  97.0 F   12/25/21 10:51


 


Pulse  100   12/25/21 12:27


 


Resp  16   12/25/21 10:51


 


BP  104/58 L  12/25/21 12:27


 


Pulse Ox  97   12/25/21 10:51











Weight - Most Recent: 193 lb 1.999 oz


I&O - Last 24 Hours: 


                                 Intake & Output











 12/24/21 12/25/21 12/25/21





 22:59 06:59 14:59


 


Intake Total 590  


 


Output Total 800  275


 


Balance -210  -275











Lab Results Last 24 Hours: 


                         Laboratory Results - last 24 hr











  12/24/21 12/24/21 12/24/21 Range/Units





  13:47 16:17 20:55 


 


WBC     (4.5-11.0)  K/uL


 


RBC     (4.30-5.90)  M/uL


 


Hgb     (12.0-15.0)  g/dL


 


Hct     (40.0-54.0)  %


 


MCV     (80-98)  fL


 


MCH     (27-31)  pg


 


MCHC     (32-36)  %


 


Plt Count     (150-400)  K/uL


 


Neut % (Auto)     (36-66)  %


 


Lymph % (Auto)     (24-44)  %


 


Mono % (Auto)     (2-6)  %


 


Eos % (Auto)     (2-4)  %


 


Baso % (Auto)     (0-1)  %


 


Sodium     (140-148)  mmol/L


 


Potassium     (3.6-5.2)  mmol/L


 


Chloride     (100-108)  mmol/L


 


Carbon Dioxide     (21-32)  mmol/L


 


Anion Gap     (5.0-14.0)  mmol/L


 


BUN     (7-18)  mg/dL


 


Creatinine     (0.8-1.3)  mg/dL


 


Est Cr Clr Drug Dosing     mL/min


 


Estimated GFR (MDRD)     (>60)  


 


Glucose     ()  mg/dL


 


POC Glucose  97  81  211 H  ()  mg/dL


 


Calcium     (8.5-10.1)  mg/dL














  12/25/21 12/25/21 12/25/21 Range/Units





  04:10 04:10 07:26 


 


WBC   6.0   (4.5-11.0)  K/uL


 


RBC   5.52   (4.30-5.90)  M/uL


 


Hgb   14.4   (12.0-15.0)  g/dL


 


Hct   46.8   (40.0-54.0)  %


 


MCV   85   (80-98)  fL


 


MCH   26 L   (27-31)  pg


 


MCHC   31 L   (32-36)  %


 


Plt Count   113 L   (150-400)  K/uL


 


Neut % (Auto)   91.7 H   (36-66)  %


 


Lymph % (Auto)   6.5 L   (24-44)  %


 


Mono % (Auto)   1.8 L   (2-6)  %


 


Eos % (Auto)   0.0 L   (2-4)  %


 


Baso % (Auto)   0.0   (0-1)  %


 


Sodium  142    (140-148)  mmol/L


 


Potassium  4.0    (3.6-5.2)  mmol/L


 


Chloride  103    (100-108)  mmol/L


 


Carbon Dioxide  30    (21-32)  mmol/L


 


Anion Gap  9.5    (5.0-14.0)  mmol/L


 


BUN  20 H    (7-18)  mg/dL


 


Creatinine  1.8 H    (0.8-1.3)  mg/dL


 


Est Cr Clr Drug Dosing  33.80    mL/min


 


Estimated GFR (MDRD)  36 L    (>60)  


 


Glucose  174 H    ()  mg/dL


 


POC Glucose    159 H  ()  mg/dL


 


Calcium  9.0    (8.5-10.1)  mg/dL














  12/25/21 Range/Units





  11:16 


 


WBC   (4.5-11.0)  K/uL


 


RBC   (4.30-5.90)  M/uL


 


Hgb   (12.0-15.0)  g/dL


 


Hct   (40.0-54.0)  %


 


MCV   (80-98)  fL


 


MCH   (27-31)  pg


 


MCHC   (32-36)  %


 


Plt Count   (150-400)  K/uL


 


Neut % (Auto)   (36-66)  %


 


Lymph % (Auto)   (24-44)  %


 


Mono % (Auto)   (2-6)  %


 


Eos % (Auto)   (2-4)  %


 


Baso % (Auto)   (0-1)  %


 


Sodium   (140-148)  mmol/L


 


Potassium   (3.6-5.2)  mmol/L


 


Chloride   (100-108)  mmol/L


 


Carbon Dioxide   (21-32)  mmol/L


 


Anion Gap   (5.0-14.0)  mmol/L


 


BUN   (7-18)  mg/dL


 


Creatinine   (0.8-1.3)  mg/dL


 


Est Cr Clr Drug Dosing   mL/min


 


Estimated GFR (MDRD)   (>60)  


 


Glucose   ()  mg/dL


 


POC Glucose  256 H  ()  mg/dL


 


Calcium   (8.5-10.1)  mg/dL











Med Orders - Current: 


                               Current Medications





Acetaminophen/Codeine Phosphate (Acetaminophen/Codeine 300-30 Mg Tab)  1 tab PO 

Q6H PRN


   PRN Reason: Pain


Aspirin (Aspirin 81 Mg Tab.Ec)  81 mg PO DAILY [facility]


   Last Admin: 12/25/21 11:11 Dose:  Not Given


   Documented by: 


Atenolol (Atenolol 25 Mg Tab)  25 mg PO DAILY [facility]


   Last Admin: 12/25/21 12:27 Dose:  25 mg


   Documented by: 


Furosemide (Furosemide 20 Mg/2 Ml Vial)  20 mg IVPUSH Q6H [facility]


   Last Admin: 12/25/21 00:02 Dose:  20 mg


   Documented by: 


Glipizide (Glipizide 5 Mg Tab.Er)  10 mg PO BIDMEALS [facility]


   Last Admin: 12/25/21 11:11 Dose:  Not Given


   Documented by: 


Guaifenesin/Dextromethorphan (Guaifenesin/Dextromethorphan 100-10 Mg/5 Ml Soln 

10 Ml Cup)  10 ml PO Q4H PRN


   PRN Reason: Cough


Hydrochlorothiazide (Hydrochlorothiazide 25 Mg Tab)  25 mg PO DAILY [facility]


   Last Admin: 12/25/21 11:11 Dose:  Not Given


   Documented by: 


Ceftriaxone Sodium 1 gm/ (Sodium Chloride)  50 mls @ 100 mls/hr IV Q24H [facility]


Pantoprazole Sodium (Pantoprazole 40 Mg Tab.Cr)  40 mg PO BIDAC [facility]


   Last Admin: 12/25/21 11:11 Dose:  Not Given


   Documented by: 


Sodium Chloride (Sodium Chloride 0.9% 10 Ml Syringe)  10 ml FLUSH ASDIRECTED PRN


   PRN Reason: Keep Vein Open


Spironolactone (Spironolactone 25 Mg Tab)  25 mg PO DAILY [facility]


   Last Admin: 12/25/21 11:11 Dose:  Not Given


   Documented by: 


Tramadol HCl (Tramadol 50 Mg Tab)  50 mg PO Q6H PRN


   PRN Reason: Pain


   Last Admin: 12/24/21 16:27 Dose:  50 mg


   Documented by: 





Discontinued Medications





Ceftriaxone Sodium (Ceftriaxone 1 Gm Advvial) Confirm Administered Dose 2 gm IV 

.STK-MED ONE


   Stop: 12/24/21 20:59


   Last Admin: 12/24/21 21:06 Dose:  Not Given


   Documented by: 


Diphenhydramine HCl (Diphenhydramine 50 Mg/Ml Sdv)  25 mg IVPUSH ONETIME ONE


   Stop: 12/24/21 20:01


   Last Admin: 12/24/21 20:39 Dose:  25 mg


   Documented by: 


Diphenhydramine HCl (Diphenhydramine 50 Mg/Ml Sdv)  25 mg IVPUSH ONETIME ONE


   Stop: 12/25/21 08:01


Furosemide (Furosemide 20 Mg/2 Ml Vial)  20 mg IVPUSH ONETIME ONE


   Stop: 12/23/21 21:11


   Last Admin: 12/23/21 22:01 Dose:  20 mg


   Documented by: 


Ceftriaxone Sodium 2 gm/ (Sodium Chloride)  50 mls @ 100 mls/hr IV ONETIME ONE


   Stop: 12/24/21 21:12


   Last Admin: 12/24/21 22:01 Dose:  Not Given


   Documented by: 


Ceftriaxone Sodium 2 gm/ (Sodium Chloride)  50 mls @ 100 mls/hr IV ONETIME ONE


   Stop: 12/24/21 22:29


   Last Admin: 12/24/21 22:14 Dose:  100 mls/hr


   Documented by: 


Lidocaine HCl (Lidocaine 2% Jelly 10 Ml Urojet)  10 ml MUCMEM ONETIME ONE


   Stop: 12/23/21 21:48


   Last Admin: 12/23/21 22:01 Dose:  10 ml


   Documented by: 


Methylprednisolone Sodium Succinate (Methylprednisolone Sodium Succinate 40 Mg/1

Ml Sdv)  80 mg IVPUSH ONETIME ONE


   Stop: 12/24/21 20:01


   Last Admin: 12/24/21 20:41 Dose:  80 mg


   Documented by: 


Methylprednisolone Sodium Succinate (Methylprednisolone Sodium Succinate 40 Mg/1

Ml Sdv)  80 mg IVPUSH ONETIME ONE


   Stop: 12/25/21 08:01


Metolazone (Metolazone 2.5 Mg Tab)  5 mg PO ONETIME ONE


   Stop: 12/23/21 21:49


   Last Admin: 12/23/21 22:37 Dose:  5 mg


   Documented by: 











- Exam


Urinary Catheter Total Time: 1Days  10Hours


General: Alert, Oriented, Cooperative


HEENT: Pupils Equal, Pupils Reactive, EOMI, Mucous Membr. Moist/Pink


Lungs: Clear to Auscultation


Cardiovascular: Regular Rate


GI/Abdominal Exam: Normal Bowel Sounds, Soft


Extremities: Pedal Edema


Peripheral Pulses: 1+: Radial (L), Radial (R)


Skin: Warm, Dry


Neurological: No New Focal Deficit


Psy/Mental Status: Alert, Normal Affect





- Patient Data


Lab Results Last 24 hrs: 


                         Laboratory Results - last 24 hr











  12/24/21 12/24/21 12/24/21 Range/Units





  13:47 16:17 20:55 


 


WBC     (4.5-11.0)  K/uL


 


RBC     (4.30-5.90)  M/uL


 


Hgb     (12.0-15.0)  g/dL


 


Hct     (40.0-54.0)  %


 


MCV     (80-98)  fL


 


MCH     (27-31)  pg


 


MCHC     (32-36)  %


 


Plt Count     (150-400)  K/uL


 


Neut % (Auto)     (36-66)  %


 


Lymph % (Auto)     (24-44)  %


 


Mono % (Auto)     (2-6)  %


 


Eos % (Auto)     (2-4)  %


 


Baso % (Auto)     (0-1)  %


 


Sodium     (140-148)  mmol/L


 


Potassium     (3.6-5.2)  mmol/L


 


Chloride     (100-108)  mmol/L


 


Carbon Dioxide     (21-32)  mmol/L


 


Anion Gap     (5.0-14.0)  mmol/L


 


BUN     (7-18)  mg/dL


 


Creatinine     (0.8-1.3)  mg/dL


 


Est Cr Clr Drug Dosing     mL/min


 


Estimated GFR (MDRD)     (>60)  


 


Glucose     ()  mg/dL


 


POC Glucose  97  81  211 H  ()  mg/dL


 


Calcium     (8.5-10.1)  mg/dL














  12/25/21 12/25/21 12/25/21 Range/Units





  04:10 04:10 07:26 


 


WBC   6.0   (4.5-11.0)  K/uL


 


RBC   5.52   (4.30-5.90)  M/uL


 


Hgb   14.4   (12.0-15.0)  g/dL


 


Hct   46.8   (40.0-54.0)  %


 


MCV   85   (80-98)  fL


 


MCH   26 L   (27-31)  pg


 


MCHC   31 L   (32-36)  %


 


Plt Count   113 L   (150-400)  K/uL


 


Neut % (Auto)   91.7 H   (36-66)  %


 


Lymph % (Auto)   6.5 L   (24-44)  %


 


Mono % (Auto)   1.8 L   (2-6)  %


 


Eos % (Auto)   0.0 L   (2-4)  %


 


Baso % (Auto)   0.0   (0-1)  %


 


Sodium  142    (140-148)  mmol/L


 


Potassium  4.0    (3.6-5.2)  mmol/L


 


Chloride  103    (100-108)  mmol/L


 


Carbon Dioxide  30    (21-32)  mmol/L


 


Anion Gap  9.5    (5.0-14.0)  mmol/L


 


BUN  20 H    (7-18)  mg/dL


 


Creatinine  1.8 H    (0.8-1.3)  mg/dL


 


Est Cr Clr Drug Dosing  33.80    mL/min


 


Estimated GFR (MDRD)  36 L    (>60)  


 


Glucose  174 H    ()  mg/dL


 


POC Glucose    159 H  ()  mg/dL


 


Calcium  9.0    (8.5-10.1)  mg/dL














  12/25/21 Range/Units





  11:16 


 


WBC   (4.5-11.0)  K/uL


 


RBC   (4.30-5.90)  M/uL


 


Hgb   (12.0-15.0)  g/dL


 


Hct   (40.0-54.0)  %


 


MCV   (80-98)  fL


 


MCH   (27-31)  pg


 


MCHC   (32-36)  %


 


Plt Count   (150-400)  K/uL


 


Neut % (Auto)   (36-66)  %


 


Lymph % (Auto)   (24-44)  %


 


Mono % (Auto)   (2-6)  %


 


Eos % (Auto)   (2-4)  %


 


Baso % (Auto)   (0-1)  %


 


Sodium   (140-148)  mmol/L


 


Potassium   (3.6-5.2)  mmol/L


 


Chloride   (100-108)  mmol/L


 


Carbon Dioxide   (21-32)  mmol/L


 


Anion Gap   (5.0-14.0)  mmol/L


 


BUN   (7-18)  mg/dL


 


Creatinine   (0.8-1.3)  mg/dL


 


Est Cr Clr Drug Dosing   mL/min


 


Estimated GFR (MDRD)   (>60)  


 


Glucose   ()  mg/dL


 


POC Glucose  256 H  ()  mg/dL


 


Calcium   (8.5-10.1)  mg/dL











Result Diagrams: 


                                 12/25/21 04:10





                                 12/25/21 04:10





Sepsis Event Note





- Evaluation


Sepsis Screening Result: No Definite Risk





- Focused Exam


Vital Signs: 


                                   Vital Signs











  Temp Pulse Pulse Resp BP BP Pulse Ox


 


 12/25/21 12:27   100    104/58 L  


 


 12/25/21 10:51  97.0 F   100  16   104/58 L  97


 


 12/25/21 08:37  96.5 F L   99  16   143/73 H  94 L


 


 12/25/21 03:29  95.1 F L   84  16   129/77  99














- Problem List Review


Problem List Initiated/Reviewed/Updated: Yes





- My Orders


Last 24 Hours: 


My Active Orders





12/24/21 12:00


Furosemide [Lasix]   20 mg IVPUSH Q6H 





12/24/21 17:55


Acetaminophen/Codeine [Tylenol with Codeine No.3 300MG/30MG]   1 tab PO Q6H PRN 





12/24/21 17:57


Dextromethorphan/guaiFENesin [Robitussin DM]   10 ml PO Q4H PRN 





12/24/21 21:00


GLUCOSE POC LAB TO COLLECT JPM [POC] QIDACANDBED 





12/25/21 06:05


Communication Order [RC] ASDIRECTED 





12/25/21 16:30


GLUCOSE POC LAB TO COLLECT JPM [POC] QIDACANDBED 





12/25/21 21:00


GLUCOSE POC LAB TO COLLECT JPM [POC] QIDACANDBED 


cefTRIAXone [Rocephin] 1 gm   Sodium Chloride 0.9% [Normal Saline AdvBag] 50 ml 

IV Q24H 





12/26/21 07:30


GLUCOSE POC LAB TO COLLECT JPM [POC] QIDACANDBED 





12/26/21 11:30


GLUCOSE POC LAB TO COLLECT JPM [POC] QIDACANDBED 





12/26/21 16:30


GLUCOSE POC LAB TO COLLECT JPM [POC] QIDACANDBED 





12/26/21 21:00


GLUCOSE POC LAB TO COLLECT JPM [POC] QIDACANDBED 





12/27/21 07:30


GLUCOSE POC LAB TO COLLECT JPM [POC] QIDACANDBED 





12/27/21 11:30


GLUCOSE POC LAB TO COLLECT JPM [POC] QIDACANDBED 





12/27/21 16:30


GLUCOSE POC LAB TO COLLECT JPM [POC] QIDACANDBED 





12/27/21 21:00


GLUCOSE POC LAB TO COLLECT JPM [POC] QIDACANDBED 





12/28/21 07:30


GLUCOSE POC LAB TO COLLECT JPM [POC] QIDACANDBED 





12/28/21 11:30


GLUCOSE POC LAB TO COLLECT JPM [POC] QIDACANDBED 














- Plan


Plan:: 





Assessment/Plan:  #1.  CHF with fluid retention.  Output in the last 24 is 

significant.  Creat. is up to 1.8.  Will hold the diuretics and repeat the blood

 work in the morning.  CT of the chest shows bilateral fluid in the lungs.  

Heart is rapid so kalen restart the Atenolol that was held this morning.





#2.  DMII.  Will adjust and treat blood sugars.  Elevated this morning after 

fruit.





#3.  Arthrosclerosis arteries diffuse.  Will check arterial flow in the left 

leg.





#4.  ASHD:  Chronic





#5.  BKA right leg.]





#6.  History of HTN:





#7.  History of HLD

## 2021-12-25 NOTE — CRLCT
For Patients:  As a result of the 21st Century Cures Act, medical imaging 

exams and procedure reports are released immediately into your electronic 

medical record.  You may view this report before your referring provider.  

If you have questions, please contact your health care provider.



INDICATION: elevated kidney function tests



HISTORY:



Elevated kidney function tests.



COMPARISON:



CT of the abdomen and pelvis, 11/01/2018.



TECHNIQUE:



CT of the abdomen. No intravenous contrast. Coronal/sagittal reconstruction 

images.



FINDINGS:



Lung bases: 



Partially visualized transvenous pacemaker device. Median sternotomy 

changes. Bilateral pleural effusions, small may left, moderate on the 

right. No pericardial effusion. Lung bases demonstrate atelectasis or 

consolidation adjacent to the right pleural effusion. There is significant 

motion artifact which limits image quality. No basilar pneumothorax.



Abdomen/pelvis: 



No solid hepatic mass. Cholelithiasis without associated inflammatory 

changes. Spleen size is normal. There is bilateral perinephric fat 

stranding. There is no hydronephrosis or perinephric fluid collection. 

Mixed attenuation lesion containing macroscopic fat in the right chato renal 

space, stable from previous, potentially an AML. This measures 2.3 cm in 

image 89 of series 2. No pancreatic mass or glandular atrophy. The included 

segments of the small bowel and colon are normal in caliber. There is no 

perienteric edema or mural thickening. Degenerative calcific plaque in a 

normal caliber abdominal aorta. Subcutaneous edema. This suggests 3rd 

spacing of fluid. Trace amount of abdominal ascites. No loculated or 

drainable fluid collection.



The bone windows demonstrate no suspicious bone lesions. There is 

degenerative disc disease at the endplates of the thoracic spine. On 

sagittal reconstruction images, vertebral body heights are maintained.



IMPRESSION:



1. Kidneys are negative for hydronephrosis or perinephric fluid collection.



2. Fat containing mass in the right perirenal space is unchanged from 

previous. 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



3. Subcutaneous edema, bilateral pleural effusions, right larger than left, 

which suggests 3rd spacing of fluid.



4. Cholelithiasis without associated inflammatory changes. No dilation of 

intrahepatic biliary radicles.



Dictated by Heladio Plascencia MD @ 12/25/2021 8:41:29 AM



Please note that all CT scans at this facility use dose modulation, 

iterative reconstruction, and/or weight-based dosing when appropriate to 

reduce radiation dose to as low as reasonably achievable.



Dictated by: Heladio Plascencia MD @ 12/25/2021 08:41:36



(Electronically Signed)

## 2021-12-26 RX ADMIN — GLIPIZIDE SCH MG: 5 TABLET, FILM COATED, EXTENDED RELEASE ORAL at 07:25

## 2021-12-26 RX ADMIN — GLIPIZIDE SCH MG: 5 TABLET, FILM COATED, EXTENDED RELEASE ORAL at 17:27

## 2021-12-26 NOTE — PCM.PN
- General Info


Date of Service: 12/26/21


Functional Status: Reports: Pain Controlled





- Review of Systems


General: Reports: Weakness, Fatigue


HEENT: Reports: No Symptoms


Pulmonary: Reports: Shortness of Breath


Cardiovascular: Reports: No Symptoms


Gastrointestinal: Reports: No Symptoms


Genitourinary: Reports: No Symptoms


Musculoskeletal: Reports: Leg Pain


Skin: Reports: No Symptoms


Neurological: Reports: No Symptoms


Psychiatric: Reports: No Symptoms





- Patient Data


Vitals - Most Recent: 


                                Last Vital Signs











Temp  97.5 F   12/26/21 07:00


 


Pulse  82   12/26/21 07:00


 


Resp  18   12/26/21 07:00


 


BP  93/74   12/26/21 07:00


 


Pulse Ox  100   12/26/21 07:00











Weight - Most Recent: 195 lb 12.328 oz


I&O - Last 24 Hours: 


                                 Intake & Output











 12/25/21 12/26/21 12/26/21





 22:59 06:59 14:59


 


Intake Total 480 600 


 


Output Total 400 300 


 


Balance 80 300 











Lab Results Last 24 Hours: 


                         Laboratory Results - last 24 hr











  12/25/21 12/25/21 12/25/21 Range/Units





  11:16 16:15 20:53 


 


Sodium     (140-148)  mmol/L


 


Potassium     (3.6-5.2)  mmol/L


 


Chloride     (100-108)  mmol/L


 


Carbon Dioxide     (21-32)  mmol/L


 


Anion Gap     (5.0-14.0)  mmol/L


 


BUN     (7-18)  mg/dL


 


Creatinine     (0.8-1.3)  mg/dL


 


Est Cr Clr Drug Dosing     mL/min


 


Estimated GFR (MDRD)     (>60)  


 


Glucose     ()  mg/dL


 


POC Glucose  256 H  293 H  290 H  ()  mg/dL


 


Calcium     (8.5-10.1)  mg/dL














  12/26/21 12/26/21 Range/Units





  04:25 07:33 


 


Sodium  138 L   (140-148)  mmol/L


 


Potassium  3.8   (3.6-5.2)  mmol/L


 


Chloride  102   (100-108)  mmol/L


 


Carbon Dioxide  28   (21-32)  mmol/L


 


Anion Gap  11.8   (5.0-14.0)  mmol/L


 


BUN  31 H D   (7-18)  mg/dL


 


Creatinine  1.9 H   (0.8-1.3)  mg/dL


 


Est Cr Clr Drug Dosing  32.02   mL/min


 


Estimated GFR (MDRD)  34 L   (>60)  


 


Glucose  237 H   ()  mg/dL


 


POC Glucose   195 H  ()  mg/dL


 


Calcium  8.5   (8.5-10.1)  mg/dL











Med Orders - Current: 


                               Current Medications





Acetaminophen/Codeine Phosphate (Acetaminophen/Codeine 300-30 Mg Tab)  1 tab PO 

Q6H PRN


   PRN Reason: Pain


Aspirin (Aspirin 81 Mg Tab.Ec)  81 mg PO DAILY [facility]


   Last Admin: 12/25/21 11:11 Dose:  Not Given


   Documented by: 


Atenolol (Atenolol 25 Mg Tab)  25 mg PO DAILY [facility]


   Last Admin: 12/25/21 12:27 Dose:  25 mg


   Documented by: 


Glipizide (Glipizide 5 Mg Tab.Er)  10 mg PO BIDMEALS [facility]


   Last Admin: 12/26/21 07:25 Dose:  10 mg


   Documented by: 


Guaifenesin/Dextromethorphan (Guaifenesin/Dextromethorphan 100-10 Mg/5 Ml Soln 

10 Ml Cup)  10 ml PO Q4H PRN


   PRN Reason: Cough


Ceftriaxone Sodium 1 gm/ (Sodium Chloride)  50 mls @ 100 mls/hr IV Q24H [facility]


   Last Admin: 12/25/21 20:56 Dose:  100 mls/hr


   Documented by: 


Pantoprazole Sodium (Pantoprazole 40 Mg Tab.Cr)  40 mg PO BIDAC [facility]


   Last Admin: 12/26/21 07:25 Dose:  40 mg


   Documented by: 


Sodium Chloride (Sodium Chloride 0.9% 10 Ml Syringe)  10 ml FLUSH ASDIRECTED PRN


   PRN Reason: Keep Vein Open


Tramadol HCl (Tramadol 50 Mg Tab)  50 mg PO Q6H PRN


   PRN Reason: Pain


   Last Admin: 12/24/21 16:27 Dose:  50 mg


   Documented by: 





Discontinued Medications





Ceftriaxone Sodium (Ceftriaxone 1 Gm Advvial) Confirm Administered Dose 2 gm IV 

.STK-MED ONE


   Stop: 12/24/21 20:59


   Last Admin: 12/24/21 21:06 Dose:  Not Given


   Documented by: 


Diphenhydramine HCl (Diphenhydramine 50 Mg/Ml Sdv)  25 mg IVPUSH ONETIME ONE


   Stop: 12/24/21 20:01


   Last Admin: 12/24/21 20:39 Dose:  25 mg


   Documented by: 


Diphenhydramine HCl (Diphenhydramine 50 Mg/Ml Sdv)  25 mg IVPUSH ONETIME ONE


   Stop: 12/25/21 08:01


Furosemide (Furosemide 20 Mg/2 Ml Vial)  20 mg IVPUSH ONETIME ONE


   Stop: 12/23/21 21:11


   Last Admin: 12/23/21 22:01 Dose:  20 mg


   Documented by: 


Furosemide (Furosemide 20 Mg/2 Ml Vial)  20 mg IVPUSH Q6H [facility]


   Last Admin: 12/25/21 00:02 Dose:  20 mg


   Documented by: 


Hydrochlorothiazide (Hydrochlorothiazide 25 Mg Tab)  25 mg PO DAILY [facility]


   Last Admin: 12/25/21 11:11 Dose:  Not Given


   Documented by: 


Ceftriaxone Sodium 2 gm/ (Sodium Chloride)  50 mls @ 100 mls/hr IV ONETIME ONE


   Stop: 12/24/21 21:12


   Last Admin: 12/24/21 22:01 Dose:  Not Given


   Documented by: 


Ceftriaxone Sodium 2 gm/ (Sodium Chloride)  50 mls @ 100 mls/hr IV ONETIME ONE


   Stop: 12/24/21 22:29


   Last Admin: 12/24/21 22:14 Dose:  100 mls/hr


   Documented by: 


Lidocaine HCl (Lidocaine 2% Jelly 10 Ml Urojet)  10 ml MUCMEM ONETIME ONE


   Stop: 12/23/21 21:48


   Last Admin: 12/23/21 22:01 Dose:  10 ml


   Documented by: 


Methylprednisolone Sodium Succinate (Methylprednisolone Sodium Succinate 40 Mg/1

Ml Sdv)  80 mg IVPUSH ONETIME ONE


   Stop: 12/24/21 20:01


   Last Admin: 12/24/21 20:41 Dose:  80 mg


   Documented by: 


Methylprednisolone Sodium Succinate (Methylprednisolone Sodium Succinate 40 Mg/1

Ml Sdv)  80 mg IVPUSH ONETIME ONE


   Stop: 12/25/21 08:01


Metolazone (Metolazone 2.5 Mg Tab)  5 mg PO ONETIME ONE


   Stop: 12/23/21 21:49


   Last Admin: 12/23/21 22:37 Dose:  5 mg


   Documented by: 


Spironolactone (Spironolactone 25 Mg Tab)  25 mg PO DAILY [facility]


   Last Admin: 12/25/21 11:11 Dose:  Not Given


   Documented by: 











- Exam


Urinary Catheter Total Time: 2Days  1Hours


General: Alert, Oriented, Mild Distress


HEENT: Pupils Equal, Pupils Reactive, EOMI, Mucous Membr. Moist/Pink


Neck: Supple


Lungs: Clear to Auscultation, Normal Respiratory Effort


Cardiovascular: Regular Rate


GI/Abdominal Exam: Normal Bowel Sounds, Soft


Extremities: Pedal Edema


Peripheral Pulses: 1+: Radial (L), Radial (R)





- Patient Data


Lab Results Last 24 hrs: 


                         Laboratory Results - last 24 hr











  12/25/21 12/25/21 12/25/21 Range/Units





  11:16 16:15 20:53 


 


Sodium     (140-148)  mmol/L


 


Potassium     (3.6-5.2)  mmol/L


 


Chloride     (100-108)  mmol/L


 


Carbon Dioxide     (21-32)  mmol/L


 


Anion Gap     (5.0-14.0)  mmol/L


 


BUN     (7-18)  mg/dL


 


Creatinine     (0.8-1.3)  mg/dL


 


Est Cr Clr Drug Dosing     mL/min


 


Estimated GFR (MDRD)     (>60)  


 


Glucose     ()  mg/dL


 


POC Glucose  256 H  293 H  290 H  ()  mg/dL


 


Calcium     (8.5-10.1)  mg/dL














  12/26/21 12/26/21 Range/Units





  04:25 07:33 


 


Sodium  138 L   (140-148)  mmol/L


 


Potassium  3.8   (3.6-5.2)  mmol/L


 


Chloride  102   (100-108)  mmol/L


 


Carbon Dioxide  28   (21-32)  mmol/L


 


Anion Gap  11.8   (5.0-14.0)  mmol/L


 


BUN  31 H D   (7-18)  mg/dL


 


Creatinine  1.9 H   (0.8-1.3)  mg/dL


 


Est Cr Clr Drug Dosing  32.02   mL/min


 


Estimated GFR (MDRD)  34 L   (>60)  


 


Glucose  237 H   ()  mg/dL


 


POC Glucose   195 H  ()  mg/dL


 


Calcium  8.5   (8.5-10.1)  mg/dL











Result Diagrams: 


                                 12/25/21 04:10





                                 12/26/21 04:25





Sepsis Event Note





- Evaluation


Sepsis Screening Result: No Definite Risk





- Focused Exam


Vital Signs: 


                                   Vital Signs











  Temp Pulse Resp BP Pulse Ox


 


 12/26/21 07:00  97.5 F  82  18  93/74  100


 


 12/26/21 02:32  95.2 F L  86   105/71  94 L


 


 12/25/21 23:00   88  18  122/80  98














- Problem List Review


Problem List Initiated/Reviewed/Updated: Yes





- My Orders


Last 24 Hours: 


My Active Orders





12/25/21 13:17


Resuscitation Status Routine 





12/25/21 21:00


cefTRIAXone [Rocephin] 1 gm   Sodium Chloride 0.9% [Normal Saline AdvBag] 50 ml 

IV Q24H 





12/26/21 11:30


GLUCOSE POC LAB TO COLLECT JPM [POC] QIDACANDBED 





12/26/21 16:30


GLUCOSE POC LAB TO COLLECT JPM [POC] QIDACANDBED 





12/26/21 21:00


GLUCOSE POC LAB TO COLLECT JPM [POC] QIDACANDBED 





12/27/21 07:30


GLUCOSE POC LAB TO COLLECT JPM [POC] QIDACANDBED 





12/27/21 11:30


GLUCOSE POC LAB TO COLLECT JPM [POC] QIDACANDBED 





12/27/21 16:30


GLUCOSE POC LAB TO COLLECT JPM [POC] QIDACANDBED 





12/27/21 21:00


GLUCOSE POC LAB TO COLLECT JPM [POC] QIDACANDBED 





12/28/21 07:30


GLUCOSE POC LAB TO COLLECT JPM [POC] QIDACANDBED 





12/28/21 11:30


GLUCOSE POC LAB TO COLLECT JPM [POC] QIDACANDBED 














- Plan


Plan:: 





Assessment/Plan:  #1.  CHF with fluid retention.  Output in the last 24 is 

adequate.  Creat. is up to 1.9.  Will hold the diuretics and repeat the blood 

work in the morning.  CT of the chest shows bilateral fluid in the lungs.  Heart

 is rapid so kalen restart the Atenolol that was held this morning.





#2.  DMII.  Will adjust and treat blood sugars.  Elevated this morning after 

fruit.





#3.  Arthrosclerosis arteries diffuse.  Will check arterial flow in the left 

leg.





#4.  ASHD:  Chronic





#5.  BKA right leg.]





#6.  History of HTN:





#7.  History of HLD

## 2021-12-27 RX ADMIN — GLIPIZIDE SCH MG: 5 TABLET, FILM COATED, EXTENDED RELEASE ORAL at 16:12

## 2021-12-27 RX ADMIN — GLIPIZIDE SCH MG: 5 TABLET, FILM COATED, EXTENDED RELEASE ORAL at 09:05

## 2021-12-27 NOTE — PCM.PN
- General Info


Date of Service: 12/27/21


Functional Status: Reports: Pain Controlled





- Review of Systems


General: Reports: Weakness


HEENT: Reports: No Symptoms


Pulmonary: Reports: Shortness of Breath


Cardiovascular: Reports: No Symptoms


Gastrointestinal: Reports: No Symptoms


Genitourinary: Reports: No Symptoms


Musculoskeletal: Reports: No Symptoms


Skin: Reports: No Symptoms


Neurological: Reports: No Symptoms


Psychiatric: Reports: No Symptoms





- Patient Data


Vitals - Most Recent: 


                                Last Vital Signs











Temp  95.2 F L  12/27/21 14:46


 


Pulse  85   12/27/21 14:46


 


Resp  16   12/27/21 14:46


 


BP  99/77   12/27/21 14:46


 


Pulse Ox  100   12/27/21 14:46











Weight - Most Recent: 198 lb 6.656 oz


I&O - Last 24 Hours: 


                                 Intake & Output











 12/27/21 12/27/21 12/27/21





 06:59 14:59 22:59


 


Intake Total 300 570 


 


Output Total 750 250 


 


Balance -450 320 











Lab Results Last 24 Hours: 


                         Laboratory Results - last 24 hr











  12/26/21 12/26/21 12/27/21 Range/Units





  16:45 20:53 04:15 


 


Sodium    142  (140-148)  mmol/L


 


Potassium    3.8  (3.6-5.2)  mmol/L


 


Chloride    103  (100-108)  mmol/L


 


Carbon Dioxide    31  (21-32)  mmol/L


 


Anion Gap    7.7  (5.0-14.0)  mmol/L


 


BUN    34 H  (7-18)  mg/dL


 


Creatinine    2.0 H  (0.8-1.3)  mg/dL


 


Est Cr Clr Drug Dosing    30.42  mL/min


 


Estimated GFR (MDRD)    32 L  (>60)  


 


Glucose    168 H  ()  mg/dL


 


POC Glucose  182 H  270 H   ()  mg/dL


 


Calcium    8.9  (8.5-10.1)  mg/dL














  12/27/21 12/27/21 Range/Units





  07:14 11:14 


 


Sodium    (140-148)  mmol/L


 


Potassium    (3.6-5.2)  mmol/L


 


Chloride    (100-108)  mmol/L


 


Carbon Dioxide    (21-32)  mmol/L


 


Anion Gap    (5.0-14.0)  mmol/L


 


BUN    (7-18)  mg/dL


 


Creatinine    (0.8-1.3)  mg/dL


 


Est Cr Clr Drug Dosing    mL/min


 


Estimated GFR (MDRD)    (>60)  


 


Glucose    ()  mg/dL


 


POC Glucose  129 H  149 H  ()  mg/dL


 


Calcium    (8.5-10.1)  mg/dL











Med Orders - Current: 


                               Current Medications





Acetaminophen/Codeine Phosphate (Acetaminophen/Codeine 300-30 Mg Tab)  1 tab PO 

Q6H PRN


   PRN Reason: Pain


   Last Admin: 12/27/21 08:03 Dose:  1 tab


   Documented by: 


Aspirin (Aspirin 81 Mg Tab.Ec)  81 mg PO DAILY [facility]


   Last Admin: 12/27/21 09:05 Dose:  81 mg


   Documented by: 


Atenolol (Atenolol 25 Mg Tab)  25 mg PO DAILY [facility]


   Last Admin: 12/27/21 09:06 Dose:  25 mg


   Documented by: 


Bacitracin (Bacitracin Oint 28.35 Gm Tube)  0 gm TOP BID [facility]


   Last Admin: 12/27/21 09:06 Dose:  1 applic


   Documented by: 


Enoxaparin Sodium (Enoxaparin 30 Mg/0.3 Ml Syringe)  30 mg SUBCUT Q24H [facility]


   Last Admin: 12/27/21 09:06 Dose:  30 mg


   Documented by: 


Glipizide (Glipizide 5 Mg Tab.Er)  10 mg PO BIDMEALS [facility]


   Last Admin: 12/27/21 09:05 Dose:  10 mg


   Documented by: 


Guaifenesin/Dextromethorphan (Guaifenesin/Dextromethorphan 100-10 Mg/5 Ml Soln 

10 Ml Cup)  10 ml PO Q4H PRN


   PRN Reason: Cough


Ceftriaxone Sodium 1 gm/ (Sodium Chloride)  50 mls @ 100 mls/hr IV Q24H [facility]


   Last Admin: 12/26/21 20:19 Dose:  100 mls/hr


   Documented by: 


Pantoprazole Sodium (Pantoprazole 40 Mg Tab.Cr)  40 mg PO BIDAC [facility]


   Last Admin: 12/27/21 09:05 Dose:  40 mg


   Documented by: 


Sodium Chloride (Sodium Chloride 0.9% 10 Ml Syringe)  10 ml FLUSH ASDIRECTED PRN


   PRN Reason: Keep Vein Open


Tramadol HCl (Tramadol 50 Mg Tab)  50 mg PO Q6H PRN


   PRN Reason: Pain


   Last Admin: 12/24/21 16:27 Dose:  50 mg


   Documented by: 





Discontinued Medications





Aspirin (Aspirin 325 Mg Tab.Ec)  325 mg PO ONETIME ONE


   Stop: 12/27/21 15:31


   Last Admin: 12/27/21 15:35 Dose:  325 mg


   Documented by: 


Ceftriaxone Sodium (Ceftriaxone 1 Gm Advvial) Confirm Administered Dose 2 gm IV 

.STK-MED ONE


   Stop: 12/24/21 20:59


   Last Admin: 12/24/21 21:06 Dose:  Not Given


   Documented by: 


Diphenhydramine HCl (Diphenhydramine 50 Mg/Ml Sdv)  25 mg IVPUSH ONETIME ONE


   Stop: 12/24/21 20:01


   Last Admin: 12/24/21 20:39 Dose:  25 mg


   Documented by: 


Diphenhydramine HCl (Diphenhydramine 50 Mg/Ml Sdv)  25 mg IVPUSH ONETIME ONE


   Stop: 12/25/21 08:01


Furosemide (Furosemide 20 Mg/2 Ml Vial)  20 mg IVPUSH ONETIME ONE


   Stop: 12/23/21 21:11


   Last Admin: 12/23/21 22:01 Dose:  20 mg


   Documented by: 


Furosemide (Furosemide 20 Mg/2 Ml Vial)  20 mg IVPUSH Q6H [facility]


   Last Admin: 12/25/21 00:02 Dose:  20 mg


   Documented by: 


Hydrochlorothiazide (Hydrochlorothiazide 25 Mg Tab)  25 mg PO DAILY [facility]


   Last Admin: 12/25/21 11:11 Dose:  Not Given


   Documented by: 


Ceftriaxone Sodium 2 gm/ (Sodium Chloride)  50 mls @ 100 mls/hr IV ONETIME ONE


   Stop: 12/24/21 21:12


   Last Admin: 12/24/21 22:01 Dose:  Not Given


   Documented by: 


Ceftriaxone Sodium 2 gm/ (Sodium Chloride)  50 mls @ 100 mls/hr IV ONETIME ONE


   Stop: 12/24/21 22:29


   Last Admin: 12/24/21 22:14 Dose:  100 mls/hr


   Documented by: 


Lidocaine HCl (Lidocaine 2% Jelly 10 Ml Urojet)  10 ml MUCMEM ONETIME ONE


   Stop: 12/23/21 21:48


   Last Admin: 12/23/21 22:01 Dose:  10 ml


   Documented by: 


Methylprednisolone Sodium Succinate (Methylprednisolone Sodium Succinate 40 Mg/1

Ml Sdv)  80 mg IVPUSH ONETIME ONE


   Stop: 12/24/21 20:01


   Last Admin: 12/24/21 20:41 Dose:  80 mg


   Documented by: 


Methylprednisolone Sodium Succinate (Methylprednisolone Sodium Succinate 40 Mg/1

Ml Sdv)  80 mg IVPUSH ONETIME ONE


   Stop: 12/25/21 08:01


Metolazone (Metolazone 2.5 Mg Tab)  5 mg PO ONETIME ONE


   Stop: 12/23/21 21:49


   Last Admin: 12/23/21 22:37 Dose:  5 mg


   Documented by: 


Spironolactone (Spironolactone 25 Mg Tab)  25 mg PO DAILY [facility]


   Last Admin: 12/25/21 11:11 Dose:  Not Given


   Documented by: 











- Exam


Urinary Catheter Total Time: 3Days  10Hours


General: Alert, Oriented


HEENT: Pupils Equal, Pupils Reactive, EOMI, Mucous Membr. Moist/Pink


Neck: Supple


Lungs: Clear to Auscultation, Normal Respiratory Effort


Cardiovascular: Regular Rate, Regular Rhythm


GI/Abdominal Exam: Normal Bowel Sounds, Soft, Non-Tender, No Organomegaly, No 

Distention, No Abnormal Bruit, No Mass, Pelvis Stable


Extremities: Pedal Edema





- Patient Data


Lab Results Last 24 hrs: 


                         Laboratory Results - last 24 hr











  12/26/21 12/26/21 12/27/21 Range/Units





  16:45 20:53 04:15 


 


Sodium    142  (140-148)  mmol/L


 


Potassium    3.8  (3.6-5.2)  mmol/L


 


Chloride    103  (100-108)  mmol/L


 


Carbon Dioxide    31  (21-32)  mmol/L


 


Anion Gap    7.7  (5.0-14.0)  mmol/L


 


BUN    34 H  (7-18)  mg/dL


 


Creatinine    2.0 H  (0.8-1.3)  mg/dL


 


Est Cr Clr Drug Dosing    30.42  mL/min


 


Estimated GFR (MDRD)    32 L  (>60)  


 


Glucose    168 H  ()  mg/dL


 


POC Glucose  182 H  270 H   ()  mg/dL


 


Calcium    8.9  (8.5-10.1)  mg/dL














  12/27/21 12/27/21 Range/Units





  07:14 11:14 


 


Sodium    (140-148)  mmol/L


 


Potassium    (3.6-5.2)  mmol/L


 


Chloride    (100-108)  mmol/L


 


Carbon Dioxide    (21-32)  mmol/L


 


Anion Gap    (5.0-14.0)  mmol/L


 


BUN    (7-18)  mg/dL


 


Creatinine    (0.8-1.3)  mg/dL


 


Est Cr Clr Drug Dosing    mL/min


 


Estimated GFR (MDRD)    (>60)  


 


Glucose    ()  mg/dL


 


POC Glucose  129 H  149 H  ()  mg/dL


 


Calcium    (8.5-10.1)  mg/dL











Result Diagrams: 


                                 12/25/21 04:10





                                 12/27/21 04:15





Sepsis Event Note





- Evaluation


Sepsis Screening Result: Possible Sepsis Risk





- Focused Exam


Vital Signs: 


                                   Vital Signs











  Temp Pulse Pulse Resp BP BP Pulse Ox


 


 12/27/21 14:46  95.2 F L   85  16   99/77  100


 


 12/27/21 10:47  97.1 F   89  18   135/71  99


 


 12/27/21 09:06   80    116/70  


 


 12/27/21 07:00  95.7 F L   80  18   116/70  90 L


 


 12/27/21 03:53  95.0 F L   79  18   109/78  96














- Problem List Review


Problem List Initiated/Reviewed/Updated: Yes





- My Orders


Last 24 Hours: 


My Active Orders





12/27/21 12:42


RE Hose [Antiembolic Hose] [OM.PC] Routine 





12/27/21 16:30


GLUCOSE POC LAB TO COLLECT JPM [POC] QIDACANDBED 





12/27/21 21:00


GLUCOSE POC LAB TO COLLECT JPM [POC] QIDACANDBED 





12/28/21 07:30


GLUCOSE POC LAB TO COLLECT JPM [POC] QIDACANDBED 





12/28/21 11:30


GLUCOSE POC LAB TO COLLECT JPM [POC] QIDACANDBED 














- Plan


Plan:: 





Assessment/Plan:  #1.  CHF with fluid retention.  Output in the last 24 is poor 

and creat is 2.0. Will start IV fluid and balance output to improve renal 

funcition to improve kidney funcctioin.  I will start the diuretics and and 

balance input/putput.  CXR shows decrease fluid in the lungs. Heart is 14% > 100

 bpm





#2.  DMII.  Will adjust and treat blood sugars.  Elevated this morning after 

fruit.





#3.  Arthrosclerosis arteries diffuse.  Will check arterial flow in the left 

leg.





#4.  ASHD:  Chronic





#5.  BKA right leg.]





#6.  History of HTN:





#7.  History of HLD

## 2021-12-27 NOTE — CR
CHEST: Portable 12/27/2021 at 4:31 AM

 

CLINICAL HISTORY:Pneumonia

 

COMPARISON:2321

 

FINDINGS:  Heart is enlarged. Pulmonary vascularity appears normal. Patient has

permanent cardiac pacer/defibrillator. There has been previous sternotomy. There

are bilateral pleural effusions right greater than left. These are similar if

not slightly diminished when compared to 12/23/2021.

 

IMPRESSION: Bilateral pleural effusions right greater than left. There may be

some minimal improvement since prior study.

 

Cardiomegaly

## 2021-12-28 RX ADMIN — POTASSIUM CHLORIDE SCH MEQ: 1500 TABLET, EXTENDED RELEASE ORAL at 16:09

## 2021-12-28 RX ADMIN — GLIPIZIDE SCH MG: 5 TABLET, FILM COATED, EXTENDED RELEASE ORAL at 08:07

## 2021-12-28 RX ADMIN — GLIPIZIDE SCH MG: 5 TABLET, FILM COATED, EXTENDED RELEASE ORAL at 16:09

## 2021-12-28 NOTE — PCM.EKG
** #1 Interpretation


EKG Date: 12/23/21


Time: 21:24


Rhythm: Other (Ventricular paced complexes)


Rate (Beats/Min): 104


Axis: LAD-Left Axis Deviation


P-Wave: Absent


QRS: LBBB


ST-T: Normal


QT: Prolonged


AL/PQ Interval: n/a


Comparison: NA - No Prior EKG

## 2021-12-29 RX ADMIN — GLIPIZIDE SCH MG: 5 TABLET, FILM COATED, EXTENDED RELEASE ORAL at 08:13

## 2021-12-29 RX ADMIN — POTASSIUM CHLORIDE SCH MEQ: 1500 TABLET, EXTENDED RELEASE ORAL at 08:13

## 2021-12-29 RX ADMIN — METHYLPREDNISOLONE SODIUM SUCCINATE SCH MG: 40 INJECTION, POWDER, FOR SOLUTION INTRAMUSCULAR; INTRAVENOUS at 09:40

## 2021-12-29 RX ADMIN — GLIPIZIDE SCH MG: 5 TABLET, FILM COATED, EXTENDED RELEASE ORAL at 16:44

## 2021-12-29 RX ADMIN — METHYLPREDNISOLONE SODIUM SUCCINATE SCH MG: 40 INJECTION, POWDER, FOR SOLUTION INTRAMUSCULAR; INTRAVENOUS at 13:44

## 2021-12-29 NOTE — PCM.PN
- General Info


Date of Service: 12/29/21


Functional Status: Reports: Pain Controlled





- Review of Systems


General: Reports: Weakness


HEENT: Reports: No Symptoms


Pulmonary: Reports: Shortness of Breath


Cardiovascular: Reports: No Symptoms


Gastrointestinal: Reports: No Symptoms


Genitourinary: Reports: No Symptoms


Musculoskeletal: Reports: Leg Pain


Neurological: Reports: Difficulty Walking, Weakness, Gait Disturbance





- Patient Data


Vitals - Most Recent: 


                                Last Vital Signs











Temp  98.3 F   12/29/21 10:56


 


Pulse  84   12/29/21 10:56


 


Resp  16   12/29/21 10:56


 


BP  132/72   12/29/21 10:56


 


Pulse Ox  97   12/29/21 10:56











Weight - Most Recent: 202 lb 6.15 oz


I&O - Last 24 Hours: 


                                 Intake & Output











 12/28/21 12/29/21 12/29/21





 22:59 06:59 14:59


 


Intake Total 2666  


 


Output Total 1375 1600 


 


Balance 1291 -1600 











Lab Results Last 24 Hours: 


                         Laboratory Results - last 24 hr











  12/28/21 12/28/21 12/29/21 Range/Units





  16:25 21:02 05:30 


 


WBC     (4.5-11.0)  K/uL


 


RBC     (4.30-5.90)  M/uL


 


Hgb     (12.0-15.0)  g/dL


 


Hct     (40.0-54.0)  %


 


MCV     (80-98)  fL


 


MCH     (27-31)  pg


 


MCHC     (32-36)  %


 


Plt Count     (150-400)  K/uL


 


Neut % (Auto)     (36-66)  %


 


Lymph % (Auto)     (24-44)  %


 


Mono % (Auto)     (2-6)  %


 


Eos % (Auto)     (2-4)  %


 


Baso % (Auto)     (0-1)  %


 


Sodium    144  (140-148)  mmol/L


 


Potassium    5.1  (3.6-5.2)  mmol/L


 


Chloride    105  (100-108)  mmol/L


 


Carbon Dioxide    33 H  (21-32)  mmol/L


 


Anion Gap    11.1  (5.0-14.0)  mmol/L


 


BUN    29 H  (7-18)  mg/dL


 


Creatinine    1.5 H  (0.8-1.3)  mg/dL


 


Est Cr Clr Drug Dosing    40.66  mL/min


 


Estimated GFR (MDRD)    45 L  (>60)  


 


Glucose    139 H  ()  mg/dL


 


POC Glucose  94  115 H   ()  mg/dL


 


Calcium    9.1  (8.5-10.1)  mg/dL














  12/29/21 12/29/21 12/29/21 Range/Units





  05:30 07:18 11:17 


 


WBC  5.2    (4.5-11.0)  K/uL


 


RBC  5.77    (4.30-5.90)  M/uL


 


Hgb  14.9    (12.0-15.0)  g/dL


 


Hct  48.0    (40.0-54.0)  %


 


MCV  83    (80-98)  fL


 


MCH  26 L    (27-31)  pg


 


MCHC  31 L    (32-36)  %


 


Plt Count  121 L    (150-400)  K/uL


 


Neut % (Auto)  73.1 H    (36-66)  %


 


Lymph % (Auto)  14.6 L    (24-44)  %


 


Mono % (Auto)  11.7 H    (2-6)  %


 


Eos % (Auto)  0.4 L    (2-4)  %


 


Baso % (Auto)  0.2    (0-1)  %


 


Sodium     (140-148)  mmol/L


 


Potassium     (3.6-5.2)  mmol/L


 


Chloride     (100-108)  mmol/L


 


Carbon Dioxide     (21-32)  mmol/L


 


Anion Gap     (5.0-14.0)  mmol/L


 


BUN     (7-18)  mg/dL


 


Creatinine     (0.8-1.3)  mg/dL


 


Est Cr Clr Drug Dosing     mL/min


 


Estimated GFR (MDRD)     (>60)  


 


Glucose     ()  mg/dL


 


POC Glucose   114 H  146 H  ()  mg/dL


 


Calcium     (8.5-10.1)  mg/dL











Med Orders - Current: 


                               Current Medications





Acetaminophen/Codeine Phosphate (Acetaminophen/Codeine 300-30 Mg Tab)  1 tab PO 

Q6H PRN


   PRN Reason: Pain


   Last Admin: 12/27/21 08:03 Dose:  1 tab


   Documented by: 


Aspirin (Aspirin 81 Mg Tab.Ec)  81 mg PO DAILY [facility]


   Last Admin: 12/29/21 08:12 Dose:  81 mg


   Documented by: 


Atenolol (Atenolol 25 Mg Tab)  25 mg PO DAILY [facility]


   Last Admin: 12/29/21 08:15 Dose:  Not Given


   Documented by: 


Bacitracin (Bacitracin Oint 28.35 Gm Tube)  0 gm TOP BID [facility]


   Last Admin: 12/29/21 08:12 Dose:  1 applic


   Documented by: 


Diphenhydramine HCl (Diphenhydramine 50 Mg/Ml Sdv)  50 mg IV ONETIME ONE


   Stop: 12/29/21 14:01


Enoxaparin Sodium (Enoxaparin 40 Mg/0.4 Ml Syringe)  40 mg SUBCUT Q24H [facility]


   Last Admin: 12/29/21 09:40 Dose:  40 mg


   Documented by: 


Glipizide (Glipizide 5 Mg Tab.Er)  10 mg PO BIDMEALS [facility]


   Last Admin: 12/29/21 08:13 Dose:  10 mg


   Documented by: 


Guaifenesin/Dextromethorphan (Guaifenesin/Dextromethorphan 100-10 Mg/5 Ml Soln 

10 Ml Cup)  10 ml PO Q4H PRN


   PRN Reason: Cough


Hydrochlorothiazide (Hydrochlorothiazide 25 Mg Tab)  25 mg PO DAILY [facility]


   Last Admin: 12/29/21 08:14 Dose:  25 mg


   Documented by: 


Ceftriaxone Sodium 1 gm/ (Sodium Chloride)  50 mls @ 100 mls/hr IV Q24H [facility]


   Last Admin: 12/28/21 21:21 Dose:  100 mls/hr


   Documented by: 


Sodium Chloride (Normal Saline)  1,000 mls @ 75 mls/hr IV ASDIRECTED [facility]


Methylprednisolone Sodium Succinate (Methylprednisolone Sodium Succinate 40 Mg/1

Ml Sdv)  40 mg IV Q4H [facility]


   Stop: 12/29/21 14:01


   Last Admin: 12/29/21 09:40 Dose:  40 mg


   Documented by: 


Pantoprazole Sodium (Pantoprazole 40 Mg Tab.Cr)  40 mg PO BIDAC [facility]


   Last Admin: 12/29/21 08:12 Dose:  40 mg


   Documented by: 


Sodium Chloride (Sodium Chloride 0.9% 10 Ml Syringe)  10 ml FLUSH ASDIRECTED PRN


   PRN Reason: Keep Vein Open


Spironolactone (Spironolactone 25 Mg Tab)  25 mg PO DAILY [facility]


   Last Admin: 12/29/21 08:14 Dose:  25 mg


   Documented by: 


Tramadol HCl (Tramadol 50 Mg Tab)  50 mg PO Q6H PRN


   PRN Reason: Pain


   Last Admin: 12/24/21 16:27 Dose:  50 mg


   Documented by: 





Discontinued Medications





Aspirin (Aspirin 325 Mg Tab.Ec)  325 mg PO ONETIME ONE


   Stop: 12/27/21 15:31


   Last Admin: 12/27/21 15:35 Dose:  325 mg


   Documented by: 


Ceftriaxone Sodium (Ceftriaxone 1 Gm Advvial) Confirm Administered Dose 2 gm IV 

.STK-MED ONE


   Stop: 12/24/21 20:59


   Last Admin: 12/24/21 21:06 Dose:  Not Given


   Documented by: 


Diphenhydramine HCl (Diphenhydramine 50 Mg/Ml Sdv)  25 mg IVPUSH ONETIME ONE


   Stop: 12/24/21 20:01


   Last Admin: 12/24/21 20:39 Dose:  25 mg


   Documented by: 


Diphenhydramine HCl (Diphenhydramine 50 Mg/Ml Sdv)  25 mg IVPUSH ONETIME ONE


   Stop: 12/25/21 08:01


Enoxaparin Sodium (Enoxaparin 30 Mg/0.3 Ml Syringe)  30 mg SUBCUT Q24H [facility]


   Last Admin: 12/27/21 09:06 Dose:  30 mg


   Documented by: 


Furosemide (Furosemide 20 Mg/2 Ml Vial)  20 mg IVPUSH ONETIME ONE


   Stop: 12/23/21 21:11


   Last Admin: 12/23/21 22:01 Dose:  20 mg


   Documented by: 


Furosemide (Furosemide 20 Mg/2 Ml Vial)  20 mg IVPUSH Q6H [facility]


   Last Admin: 12/25/21 00:02 Dose:  20 mg


   Documented by: 


Furosemide (Furosemide 20 Mg Tab)  20 mg PO ONETIME ONE


   Stop: 12/27/21 16:31


   Last Admin: 12/27/21 16:11 Dose:  20 mg


   Documented by: 


Furosemide (Furosemide 20 Mg/2 Ml Vial)  20 mg IVPUSH ONETIME ONE


   Stop: 12/28/21 21:47


   Last Admin: 12/28/21 22:28 Dose:  20 mg


   Documented by: 


Furosemide (Furosemide 20 Mg Tab)  20 mg PO ONETIME ONE


   Stop: 12/29/21 08:36


   Last Admin: 12/29/21 09:40 Dose:  20 mg


   Documented by: 


Furosemide (Furosemide 20 Mg Tab)  20 mg PO ONETIME ONE


   Stop: 12/29/21 13:01


Hydrochlorothiazide (Hydrochlorothiazide 25 Mg Tab)  25 mg PO DAILY [facility]


   Last Admin: 12/25/21 11:11 Dose:  Not Given


   Documented by: 


Ceftriaxone Sodium 2 gm/ (Sodium Chloride)  50 mls @ 100 mls/hr IV ONETIME ONE


   Stop: 12/24/21 21:12


   Last Admin: 12/24/21 22:01 Dose:  Not Given


   Documented by: 


Ceftriaxone Sodium 2 gm/ (Sodium Chloride)  50 mls @ 100 mls/hr IV ONETIME ONE


   Stop: 12/24/21 22:29


   Last Admin: 12/24/21 22:14 Dose:  100 mls/hr


   Documented by: 


Sodium Chloride (Normal Saline)  1,000 mls @ 100 mls/hr IV ASDIRECTED [facility]


   Last Admin: 12/28/21 16:48 Dose:  100 mls/hr


   Documented by: 


Lidocaine HCl (Lidocaine 2% Jelly 10 Ml Urojet)  10 ml MUCMEM ONETIME ONE


   Stop: 12/23/21 21:48


   Last Admin: 12/23/21 22:01 Dose:  10 ml


   Documented by: 


Methylprednisolone Sodium Succinate (Methylprednisolone Sodium Succinate 40 Mg/1

Ml Sdv)  80 mg IVPUSH ONETIME ONE


   Stop: 12/24/21 20:01


   Last Admin: 12/24/21 20:41 Dose:  80 mg


   Documented by: 


Methylprednisolone Sodium Succinate (Methylprednisolone Sodium Succinate 40 Mg/1

Ml Sdv)  80 mg IVPUSH ONETIME ONE


   Stop: 12/25/21 08:01


Metolazone (Metolazone 2.5 Mg Tab)  5 mg PO ONETIME ONE


   Stop: 12/23/21 21:49


   Last Admin: 12/23/21 22:37 Dose:  5 mg


   Documented by: 


Potassium Chloride (Potassium Chloride 20 Meq Tab.Er)  20 meq PO DAILY [facility]


   Last Admin: 12/29/21 08:13 Dose:  20 meq


   Documented by: 


Spironolactone (Spironolactone 25 Mg Tab)  25 mg PO DAILY [facility]


   Last Admin: 12/25/21 11:11 Dose:  Not Given


   Documented by: 











- Exam


Urinary Catheter Total Time: 5Days  12Hours


General: Alert, Oriented


HEENT: Pupils Equal, Pupils Reactive, EOMI, Mucous Membr. Moist/Pink


Neck: Supple


Lungs: Clear to Auscultation, Normal Respiratory Effort


Cardiovascular: Regular Rate


GI/Abdominal Exam: Soft


 (Male) Exam: Other (edema penis and scrotum)


Extremities: Pedal Edema, Leg Pain, Redness


Peripheral Pulses: 1+: Radial (L), Radial (R)


Psy/Mental Status: Alert





- Patient Data


Lab Results Last 24 hrs: 


                         Laboratory Results - last 24 hr











  12/28/21 12/28/21 12/29/21 Range/Units





  16:25 21:02 05:30 


 


WBC     (4.5-11.0)  K/uL


 


RBC     (4.30-5.90)  M/uL


 


Hgb     (12.0-15.0)  g/dL


 


Hct     (40.0-54.0)  %


 


MCV     (80-98)  fL


 


MCH     (27-31)  pg


 


MCHC     (32-36)  %


 


Plt Count     (150-400)  K/uL


 


Neut % (Auto)     (36-66)  %


 


Lymph % (Auto)     (24-44)  %


 


Mono % (Auto)     (2-6)  %


 


Eos % (Auto)     (2-4)  %


 


Baso % (Auto)     (0-1)  %


 


Sodium    144  (140-148)  mmol/L


 


Potassium    5.1  (3.6-5.2)  mmol/L


 


Chloride    105  (100-108)  mmol/L


 


Carbon Dioxide    33 H  (21-32)  mmol/L


 


Anion Gap    11.1  (5.0-14.0)  mmol/L


 


BUN    29 H  (7-18)  mg/dL


 


Creatinine    1.5 H  (0.8-1.3)  mg/dL


 


Est Cr Clr Drug Dosing    40.66  mL/min


 


Estimated GFR (MDRD)    45 L  (>60)  


 


Glucose    139 H  ()  mg/dL


 


POC Glucose  94  115 H   ()  mg/dL


 


Calcium    9.1  (8.5-10.1)  mg/dL














  12/29/21 12/29/21 12/29/21 Range/Units





  05:30 07:18 11:17 


 


WBC  5.2    (4.5-11.0)  K/uL


 


RBC  5.77    (4.30-5.90)  M/uL


 


Hgb  14.9    (12.0-15.0)  g/dL


 


Hct  48.0    (40.0-54.0)  %


 


MCV  83    (80-98)  fL


 


MCH  26 L    (27-31)  pg


 


MCHC  31 L    (32-36)  %


 


Plt Count  121 L    (150-400)  K/uL


 


Neut % (Auto)  73.1 H    (36-66)  %


 


Lymph % (Auto)  14.6 L    (24-44)  %


 


Mono % (Auto)  11.7 H    (2-6)  %


 


Eos % (Auto)  0.4 L    (2-4)  %


 


Baso % (Auto)  0.2    (0-1)  %


 


Sodium     (140-148)  mmol/L


 


Potassium     (3.6-5.2)  mmol/L


 


Chloride     (100-108)  mmol/L


 


Carbon Dioxide     (21-32)  mmol/L


 


Anion Gap     (5.0-14.0)  mmol/L


 


BUN     (7-18)  mg/dL


 


Creatinine     (0.8-1.3)  mg/dL


 


Est Cr Clr Drug Dosing     mL/min


 


Estimated GFR (MDRD)     (>60)  


 


Glucose     ()  mg/dL


 


POC Glucose   114 H  146 H  ()  mg/dL


 


Calcium     (8.5-10.1)  mg/dL











Result Diagrams: 


                                 12/29/21 05:30





                                 12/29/21 05:30





Sepsis Event Note





- Evaluation


Sepsis Screening Result: No Definite Risk





- Focused Exam


Vital Signs: 


                                   Vital Signs











  Temp Pulse Resp BP Pulse Ox


 


 12/29/21 10:56  98.3 F  84  16  132/72  97


 


 12/29/21 07:00  97.4 F  78  16  115/75  96


 


 12/29/21 05:07  95 F L  81  18  116/79 














- Problem List Review


Problem List Initiated/Reviewed/Updated: Yes





- My Orders


Last 24 Hours: 


My Active Orders





12/28/21 21:47


Convert IV to Saline Lock [OM.PC] Routine 





12/29/21 08:30


Sodium Chloride 0.9% [Normal Saline] 1,000 ml IV ASDIRECTED 





12/29/21 10:00


methylPREDNISolone Sod Succ [Solu-MEDROL]   40 mg IV Q4H 





12/29/21 14:00


diphenhydrAMINE [Benadryl]   50 mg IV ONETIME ONE 





12/29/21 15:30


Ang Abdomen Aorta w Bi Runoff [CT] Routine 





12/29/21 16:30


GLUCOSE POC LAB TO COLLECT JPM [POC] QIDACANDBED 





12/29/21 21:00


GLUCOSE POC LAB TO COLLECT JPM [POC] QIDACANDBED 





12/30/21 07:30


GLUCOSE POC LAB TO COLLECT JPM [POC] QIDACANDBED 





12/30/21 11:30


GLUCOSE POC LAB TO COLLECT JPM [POC] QIDACANDBED 





12/30/21 16:30


GLUCOSE POC LAB TO COLLECT JPM [POC] QIDACANDBED 





12/30/21 21:00


GLUCOSE POC LAB TO COLLECT JPM [POC] QIDACANDBED 














- Plan


Plan:: 





Assessment/Plan:  #1.  CHF with fluid retention.  Output in the last 24 is 

improved and creat is 1.5. Will continue  IV fluids to improve kidney functioin.

 





#2.  DMII.  Will adjust and treat blood sugars.  Elevated this morning after 

fruit.





#3.  Arthrosclerosis arteries diffuse.  Will check arterial flow in the left leg

CTA today.





#4.  ASHD:  Chronic





#5.  BKA right leg.]





#6.  History of HTN:





#7.  History of HLD

## 2021-12-29 NOTE — CRLCT
For Patients:  As a result of the 21st Century Cures Act, medical imaging 

exams and procedure reports are released immediately into your electronic 

medical record.  You may view this report before your referring provider.  

If you have questions, please contact your health care provider.



CT ANGIOGRAM ABDOMEN AND PELVIS WITH RUNOFFS WITH IV CONTRAST   



COMPARISON:  CT abdomen without intravenous contrast 12/25/2021 and CT 

abdomen without intravenous contrast 11/1/2018.  



CLINICAL HISTORY:  81-year-old male with left leg redness.  



TECHNIQUE:  Following the administration of intravenous contrast, 

contiguous axial images were obtained through the abdomen, pelvis and 

bilateral lower extremities in the early arterial phase.  Two-dimensional 

sagittal and coronal reformatted sequences were submitted.  100 cc Isovue 

370.



FINDINGS:  Abdomen:  There are bilateral pleural effusions.  These are 

moderate.  There is minimal adjacent atelectasis.  No concerning pulmonary 

nodules.  No pericardial effusion.  No concerning liver lesions.  There is 

an area of low attenuation near the falciform ligament consistent with 

focal fatty infiltration.  Calcified debris in the gallbladder consistent 

with gallstones.  The gallbladder is otherwise unremarkable.  No intra- or 

extrahepatic biliary duct dilatation.  Adrenal glands, kidneys, pancreas, 

and spleen appear unchanged.  There is a small amount of fluid around the 

spleen and in the retroperitoneum.  Small amount of fluid near the right 

kidney.  Overall, this is not significantly changed.  No acute bony lytic 

or blastic lesions. 



Pelvis:  There is a Raygoza catheter in the urinary bladder which is 

minimally distended.  There is air in the non-dependent portion, consistent 

with the presence of a Raygoza catheter.  Prostate gland appears prominent.  

Seminal vesicles are symmetric.  There are calcified vas deferens.  

Calcified phleboliths.  Small amount of free fluid in the pelvis.  Diffuse 

edema through the subcutaneous fat in the pelvis.  No acute bony lytic or 

blastic lesions.  Large amount of fluid in the scrotum.  



Lower extremities:  There are changes related to right below-knee 

amputation.  Diffuse edema in the subcutaneous tissues throughout both 

lower extremities as well as intramuscular edema.  Calcifications in the 

subcutaneous tissues in the left lower leg.  



Vasculature:  



Abdominal aorta is non-aneurysmal.  There is some ectasia of the thoracic 

aorta with some mural thrombus.  It measures up to 2.7 cm.  The celiac 

artery origin is patent.  Calcified branches of the celiac artery are 

noted.  The superior mesenteric artery is patent with a mild stenosis 

proximally.  There are two right renal arteries which appear patent.  Mild 

to moderate narrowing at the proximal left renal artery.  Inferior 

mesenteric artery origin is patent.  Heavily calcified common iliac 

arteries.  The right common iliac artery appears to have been stented and 

this appears patent.  Bilateral internal iliac arteries are heavily 

calcified.  The origin of the left is likely occluded.  The bilateral 

external iliac arteries are patent.  There is a mild narrowing of the 

distal right external iliac artery.  



Right lower extremity:  Heavily calcified common femoral artery, patent.  

Deep femoral artery is patent with some heavy calcifications.  Superficial 

femoral artery has several focal moderate stenoses in the proximal to 

mid-portion.  It is nearly occluded above the adductor hiatus and there is 

a high grade stenosis at the adductor hiatus.  Poor filling of the 

popliteal artery which may be occluded.  Poor filling may be related to 

timing of image acquisition and not a thrombus.  



Left lower extremity:  Complex plaque in the left common femoral artery 

with a mild stenosis.  Atherosclerotic disease in the deep femoral artery 

which is patent.  The superficial femoral artery is diminutive with a focal 

moderate stenosis proximally.  There is somewhat poor opacification.  

Prominent collateral near the adductor hiatus suggests a high grade 

stenosis in that area.  Popliteal artery is diminutive with scattered 

disease.  Anterior tibial artery is diseased and the walls are heavily 

calcified.  Difficult to ascertain patency.  The run-off vessels appear 

heavily calcified and difficult to ascertain the patency. 



IMPRESSION:  



1.  Aorta/inflow:  



a)  No evidence of abdominal aortic aneurysm.



b)  Patent right common/external iliac artery stent.



c)  No significant inflow disease other than diseased internal iliac 

arteries.



       



2.  Right lower extremity:  Changes related to below-knee amputation.  

Scattered disease along the superficial femoral artery with areas of near 

total occlusion of the distal SFA/adductor hiatus.  Poor opacification 

popliteal artery.   



     



3.  Left lower extremity:  Atherosclerotic plaque in the common femoral 

artery is unlikely to be hemodynamically-significant.  Scattered disease 

along the superficial femoral artery including focal areas of moderate and 

high grade stenosis.  Popliteal artery and run-off vessels have poor 

opacification and are not fully evaluated.  Duplex ultrasound of the lower 

extremities could be considered.  



4.  Moderate bilateral pleural effusions. Diffuse anasarca with a small 

amount of abdominal ascites and pelvic fluid.  



5.  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 



6.  Cholelithiasis without evidence of cholecystitis.



Please note that all CT scans at this facility use dose modulation, 

iterative reconstruction and/or weight-based dosing when appropriate to 

reduce radiation dose to as low as reasonably achievable. 



Bill Macias M.D.



Vascular and Interventional Radiology



[facility] 



[email or web address]







BEN/Dictated by: Bill Macias MD @ 12/30/2021 4:44:00 PM



(Electronically Signed)

## 2021-12-30 RX ADMIN — GLIPIZIDE SCH MG: 5 TABLET, FILM COATED, EXTENDED RELEASE ORAL at 07:23

## 2021-12-30 RX ADMIN — GLIPIZIDE SCH MG: 5 TABLET, FILM COATED, EXTENDED RELEASE ORAL at 17:13

## 2021-12-30 NOTE — PCM.PN
- General Info


Date of Service: 12/30/21


Functional Status: Reports: Pain Controlled





- Review of Systems


General: Reports: Weakness, Fatigue


HEENT: Reports: No Symptoms


Pulmonary: Reports: Shortness of Breath, Cough


Cardiovascular: Reports: No Symptoms


Gastrointestinal: Reports: No Symptoms


Genitourinary: Reports: No Symptoms


Musculoskeletal: Reports: Leg Pain, Foot Pain


Neurological: Reports: No Symptoms, Difficulty Walking, Weakness


Psychiatric: Reports: No Symptoms





- Patient Data


Vitals - Most Recent: 


                                Last Vital Signs











Temp  96.1 F L  12/30/21 15:00


 


Pulse  96   12/30/21 10:49


 


Resp  16   12/30/21 15:00


 


BP  113/64   12/30/21 15:00


 


Pulse Ox  97   12/30/21 10:49











Weight - Most Recent: 195 lb 8.8 oz


I&O - Last 24 Hours: 


                                 Intake & Output











 12/30/21 12/30/21 12/30/21





 06:59 14:59 22:59


 


Intake Total 1347 760 


 


Output Total 1000  


 


Balance 347 760 











Lab Results Last 24 Hours: 


                         Laboratory Results - last 24 hr











  12/29/21 12/29/21 12/30/21 Range/Units





  16:41 20:55 07:34 


 


WBC     (4.5-11.0)  K/uL


 


RBC     (4.30-5.90)  M/uL


 


Hgb     (12.0-15.0)  g/dL


 


Hct     (40.0-54.0)  %


 


MCV     (80-98)  fL


 


MCH     (27-31)  pg


 


MCHC     (32-36)  %


 


Plt Count     (150-400)  K/uL


 


Neut % (Auto)     (36-66)  %


 


Lymph % (Auto)     (24-44)  %


 


Mono % (Auto)     (2-6)  %


 


Eos % (Auto)     (2-4)  %


 


Baso % (Auto)     (0-1)  %


 


Sodium     (140-148)  mmol/L


 


Potassium     (3.6-5.2)  mmol/L


 


Chloride     (100-108)  mmol/L


 


Carbon Dioxide     (21-32)  mmol/L


 


Anion Gap     (5.0-14.0)  mmol/L


 


BUN     (7-18)  mg/dL


 


Creatinine     (0.8-1.3)  mg/dL


 


Est Cr Clr Drug Dosing     mL/min


 


Estimated GFR (MDRD)     (>60)  


 


Glucose     ()  mg/dL


 


POC Glucose  170 H  276 H  220 H  ()  mg/dL


 


Calcium     (8.5-10.1)  mg/dL


 


Total Bilirubin     (0.2-1.0)  mg/dL


 


AST     (15-37)  U/L


 


ALT     (12-78)  U/L


 


Alkaline Phosphatase     ()  U/L


 


Total Protein     (6.4-8.2)  g/dL


 


Albumin     (3.4-5.0)  g/dL


 


Globulin     (2.3-3.5)  g/dL


 


Albumin/Globulin Ratio     (1.2-2.2)  














  12/30/21 12/30/21 12/30/21 Range/Units





  09:41 09:41 11:25 


 


WBC  8.8    (4.5-11.0)  K/uL


 


RBC  5.90    (4.30-5.90)  M/uL


 


Hgb  15.2 H    (12.0-15.0)  g/dL


 


Hct  49.1    (40.0-54.0)  %


 


MCV  83    (80-98)  fL


 


MCH  26 L    (27-31)  pg


 


MCHC  31 L    (32-36)  %


 


Plt Count  175    (150-400)  K/uL


 


Neut % (Auto)  84.3 H    (36-66)  %


 


Lymph % (Auto)  6.8 L    (24-44)  %


 


Mono % (Auto)  8.9 H    (2-6)  %


 


Eos % (Auto)  0.0 L    (2-4)  %


 


Baso % (Auto)  0.0    (0-1)  %


 


Sodium   140   (140-148)  mmol/L


 


Potassium   4.3   (3.6-5.2)  mmol/L


 


Chloride   101   (100-108)  mmol/L


 


Carbon Dioxide   31   (21-32)  mmol/L


 


Anion Gap   8.4   (5.0-14.0)  mmol/L


 


BUN   28 H   (7-18)  mg/dL


 


Creatinine   1.7 H   (0.8-1.3)  mg/dL


 


Est Cr Clr Drug Dosing   35.87   mL/min


 


Estimated GFR (MDRD)   39 L   (>60)  


 


Glucose   284 H   ()  mg/dL


 


POC Glucose    251 H  ()  mg/dL


 


Calcium   8.9   (8.5-10.1)  mg/dL


 


Total Bilirubin   0.7   (0.2-1.0)  mg/dL


 


AST   29   (15-37)  U/L


 


ALT   29   (12-78)  U/L


 


Alkaline Phosphatase   82   ()  U/L


 


Total Protein   6.5   (6.4-8.2)  g/dL


 


Albumin   3.5   (3.4-5.0)  g/dL


 


Globulin   3.0   (2.3-3.5)  g/dL


 


Albumin/Globulin Ratio   1.2   (1.2-2.2)  











Med Orders - Current: 


                               Current Medications





Acetaminophen/Codeine Phosphate (Acetaminophen/Codeine 300-30 Mg Tab)  1 tab PO 

Q6H PRN


   PRN Reason: Pain


   Last Admin: 12/27/21 08:03 Dose:  1 tab


   Documented by: 


Aspirin (Aspirin 81 Mg Tab.Ec)  81 mg PO DAILY [facility]


   Last Admin: 12/30/21 08:46 Dose:  81 mg


   Documented by: 


Atenolol (Atenolol 25 Mg Tab)  25 mg PO DAILY [facility]


   Last Admin: 12/30/21 08:46 Dose:  25 mg


   Documented by: 


Bacitracin (Bacitracin Oint 28.35 Gm Tube)  0 gm TOP BID [facility]


   Last Admin: 12/30/21 08:46 Dose:  1 applic


   Documented by: 


Enoxaparin Sodium (Enoxaparin 40 Mg/0.4 Ml Syringe)  40 mg SUBCUT Q24H [facility]


   Last Admin: 12/30/21 09:45 Dose:  40 mg


   Documented by: 


Glipizide (Glipizide 5 Mg Tab.Er)  10 mg PO BIDMEALS [facility]


   Last Admin: 12/30/21 07:23 Dose:  10 mg


   Documented by: 


Guaifenesin/Dextromethorphan (Guaifenesin/Dextromethorphan 100-10 Mg/5 Ml Soln 

10 Ml Cup)  10 ml PO Q4H PRN


   PRN Reason: Cough


Hydrochlorothiazide (Hydrochlorothiazide 25 Mg Tab)  25 mg PO DAILY [facility]


   Last Admin: 12/30/21 08:46 Dose:  25 mg


   Documented by: 


Ceftriaxone Sodium 1 gm/ (Sodium Chloride)  50 mls @ 100 mls/hr IV Q24H [facility]


   Last Admin: 12/29/21 20:21 Dose:  100 mls/hr


   Documented by: 


Sodium Chloride (Normal Saline)  1,000 mls @ 75 mls/hr IV ASDIRECTED [facility]


   Last Admin: 12/29/21 20:21 Dose:  75 mls/hr


   Documented by: 


Pantoprazole Sodium (Pantoprazole 40 Mg Tab.Cr)  40 mg PO BIDAC [facility]


   Last Admin: 12/30/21 07:23 Dose:  40 mg


   Documented by: 


Sodium Chloride (Sodium Chloride 0.9% 10 Ml Syringe)  10 ml FLUSH ASDIRECTED PRN


   PRN Reason: Keep Vein Open


   Last Admin: 12/29/21 17:16 Dose:  10 ml


   Documented by: 


Spironolactone (Spironolactone 25 Mg Tab)  25 mg PO DAILY [facility]


   Last Admin: 12/30/21 08:46 Dose:  25 mg


   Documented by: 


Tramadol HCl (Tramadol 50 Mg Tab)  50 mg PO Q6H PRN


   PRN Reason: Pain


   Last Admin: 12/24/21 16:27 Dose:  50 mg


   Documented by: 





Discontinued Medications





Aspirin (Aspirin 325 Mg Tab.Ec)  325 mg PO ONETIME ONE


   Stop: 12/27/21 15:31


   Last Admin: 12/27/21 15:35 Dose:  325 mg


   Documented by: 


Ceftriaxone Sodium (Ceftriaxone 1 Gm Advvial) Confirm Administered Dose 2 gm IV 

.STK-MED ONE


   Stop: 12/24/21 20:59


   Last Admin: 12/24/21 21:06 Dose:  Not Given


   Documented by: 


Diphenhydramine HCl (Diphenhydramine 50 Mg/Ml Sdv)  25 mg IVPUSH ONETIME ONE


   Stop: 12/24/21 20:01


   Last Admin: 12/24/21 20:39 Dose:  25 mg


   Documented by: 


Diphenhydramine HCl (Diphenhydramine 50 Mg/Ml Sdv)  25 mg IVPUSH ONETIME ONE


   Stop: 12/25/21 08:01


Diphenhydramine HCl (Diphenhydramine 50 Mg/Ml Sdv)  50 mg IV ONETIME ONE


   Stop: 12/29/21 14:01


   Last Admin: 12/29/21 13:44 Dose:  50 mg


   Documented by: 


Enoxaparin Sodium (Enoxaparin 30 Mg/0.3 Ml Syringe)  30 mg SUBCUT Q24H [facility]


   Last Admin: 12/27/21 09:06 Dose:  30 mg


   Documented by: 


Furosemide (Furosemide 20 Mg/2 Ml Vial)  20 mg IVPUSH ONETIME ONE


   Stop: 12/23/21 21:11


   Last Admin: 12/23/21 22:01 Dose:  20 mg


   Documented by: 


Furosemide (Furosemide 20 Mg/2 Ml Vial)  20 mg IVPUSH Q6H [facility]


   Last Admin: 12/25/21 00:02 Dose:  20 mg


   Documented by: 


Furosemide (Furosemide 20 Mg Tab)  20 mg PO ONETIME ONE


   Stop: 12/27/21 16:31


   Last Admin: 12/27/21 16:11 Dose:  20 mg


   Documented by: 


Furosemide (Furosemide 20 Mg/2 Ml Vial)  20 mg IVPUSH ONETIME ONE


   Stop: 12/28/21 21:47


   Last Admin: 12/28/21 22:28 Dose:  20 mg


   Documented by: 


Furosemide (Furosemide 20 Mg Tab)  20 mg PO ONETIME ONE


   Stop: 12/29/21 08:36


   Last Admin: 12/29/21 09:40 Dose:  20 mg


   Documented by: 


Furosemide (Furosemide 20 Mg Tab)  20 mg PO ONETIME ONE


   Stop: 12/29/21 13:01


   Last Admin: 12/29/21 13:44 Dose:  20 mg


   Documented by: 


Hydrochlorothiazide (Hydrochlorothiazide 25 Mg Tab)  25 mg PO DAILY [facility]


   Last Admin: 12/25/21 11:11 Dose:  Not Given


   Documented by: 


Ceftriaxone Sodium 2 gm/ (Sodium Chloride)  50 mls @ 100 mls/hr IV ONETIME ONE


   Stop: 12/24/21 21:12


   Last Admin: 12/24/21 22:01 Dose:  Not Given


   Documented by: 


Ceftriaxone Sodium 2 gm/ (Sodium Chloride)  50 mls @ 100 mls/hr IV ONETIME ONE


   Stop: 12/24/21 22:29


   Last Admin: 12/24/21 22:14 Dose:  100 mls/hr


   Documented by: 


Sodium Chloride (Normal Saline)  1,000 mls @ 100 mls/hr IV ASDIRECTED [facility]


   Last Admin: 12/28/21 16:48 Dose:  100 mls/hr


   Documented by: 


Sodium Chloride (Normal Saline)  100 mls @ 3 mls/sec IV ASDIRECTED [facility]


   Stop: 12/29/21 17:16


   Last Admin: 12/29/21 17:16 Dose:  3 mls/sec


   Documented by: 


Iopamidol (Iopamidol 755 Mg/Ml 100 Ml Bottle)  100 ml IV .AS DIRECTED [facility]


   Stop: 12/29/21 17:16


   Last Admin: 12/29/21 17:16 Dose:  100 ml


   Documented by: 


Lidocaine HCl (Lidocaine 2% Jelly 10 Ml Urojet)  10 ml MUCMEM ONETIME ONE


   Stop: 12/23/21 21:48


   Last Admin: 12/23/21 22:01 Dose:  10 ml


   Documented by: 


Methylprednisolone Sodium Succinate (Methylprednisolone Sodium Succinate 40 Mg/1

Ml Sdv)  80 mg IVPUSH ONETIME ONE


   Stop: 12/24/21 20:01


   Last Admin: 12/24/21 20:41 Dose:  80 mg


   Documented by: 


Methylprednisolone Sodium Succinate (Methylprednisolone Sodium Succinate 40 Mg/1

Ml Sdv)  80 mg IVPUSH ONETIME ONE


   Stop: 12/25/21 08:01


Methylprednisolone Sodium Succinate (Methylprednisolone Sodium Succinate 40 Mg/1

Ml Sdv)  40 mg IV Q4H [facility]


   Stop: 12/29/21 14:01


   Last Admin: 12/29/21 13:44 Dose:  40 mg


   Documented by: 


Metolazone (Metolazone 2.5 Mg Tab)  5 mg PO ONETIME ONE


   Stop: 12/23/21 21:49


   Last Admin: 12/23/21 22:37 Dose:  5 mg


   Documented by: 


Potassium Chloride (Potassium Chloride 20 Meq Tab.Er)  20 meq PO DAILY [facility]


   Last Admin: 12/29/21 08:13 Dose:  20 meq


   Documented by: 


Spironolactone (Spironolactone 25 Mg Tab)  25 mg PO DAILY [facility]


   Last Admin: 12/25/21 11:11 Dose:  Not Given


   Documented by: 











- Exam


Urinary Catheter Total Time: 6Days  9Hours


General: Alert, Oriented


HEENT: Pupils Equal, Pupils Reactive, EOMI, Mucous Membr. Moist/Pink


Neck: Supple


Lungs: Clear to Auscultation, Normal Respiratory Effort


Cardiovascular: Regular Rate, Regular Rhythm


GI/Abdominal Exam: Normal Bowel Sounds, Soft, Non-Tender, No Organomegaly, No 

Distention, No Abnormal Bruit, No Mass, Pelvis Stable


 (Male) Exam: Other (scrotal edema and penile edema)


Extremities: Normal Inspection, Normal Range of Motion, Non-Tender, No Pedal 

Edema, Normal Capillary Refill


Peripheral Pulses: 1+: Radial (L), Radial (R)


Skin: Warm, Dry, Intact


Psy/Mental Status: Alert, Normal Affect, Normal Mood





- Patient Data


Lab Results Last 24 hrs: 


                         Laboratory Results - last 24 hr











  12/29/21 12/29/21 12/30/21 Range/Units





  16:41 20:55 07:34 


 


WBC     (4.5-11.0)  K/uL


 


RBC     (4.30-5.90)  M/uL


 


Hgb     (12.0-15.0)  g/dL


 


Hct     (40.0-54.0)  %


 


MCV     (80-98)  fL


 


MCH     (27-31)  pg


 


MCHC     (32-36)  %


 


Plt Count     (150-400)  K/uL


 


Neut % (Auto)     (36-66)  %


 


Lymph % (Auto)     (24-44)  %


 


Mono % (Auto)     (2-6)  %


 


Eos % (Auto)     (2-4)  %


 


Baso % (Auto)     (0-1)  %


 


Sodium     (140-148)  mmol/L


 


Potassium     (3.6-5.2)  mmol/L


 


Chloride     (100-108)  mmol/L


 


Carbon Dioxide     (21-32)  mmol/L


 


Anion Gap     (5.0-14.0)  mmol/L


 


BUN     (7-18)  mg/dL


 


Creatinine     (0.8-1.3)  mg/dL


 


Est Cr Clr Drug Dosing     mL/min


 


Estimated GFR (MDRD)     (>60)  


 


Glucose     ()  mg/dL


 


POC Glucose  170 H  276 H  220 H  ()  mg/dL


 


Calcium     (8.5-10.1)  mg/dL


 


Total Bilirubin     (0.2-1.0)  mg/dL


 


AST     (15-37)  U/L


 


ALT     (12-78)  U/L


 


Alkaline Phosphatase     ()  U/L


 


Total Protein     (6.4-8.2)  g/dL


 


Albumin     (3.4-5.0)  g/dL


 


Globulin     (2.3-3.5)  g/dL


 


Albumin/Globulin Ratio     (1.2-2.2)  














  12/30/21 12/30/21 12/30/21 Range/Units





  09:41 09:41 11:25 


 


WBC  8.8    (4.5-11.0)  K/uL


 


RBC  5.90    (4.30-5.90)  M/uL


 


Hgb  15.2 H    (12.0-15.0)  g/dL


 


Hct  49.1    (40.0-54.0)  %


 


MCV  83    (80-98)  fL


 


MCH  26 L    (27-31)  pg


 


MCHC  31 L    (32-36)  %


 


Plt Count  175    (150-400)  K/uL


 


Neut % (Auto)  84.3 H    (36-66)  %


 


Lymph % (Auto)  6.8 L    (24-44)  %


 


Mono % (Auto)  8.9 H    (2-6)  %


 


Eos % (Auto)  0.0 L    (2-4)  %


 


Baso % (Auto)  0.0    (0-1)  %


 


Sodium   140   (140-148)  mmol/L


 


Potassium   4.3   (3.6-5.2)  mmol/L


 


Chloride   101   (100-108)  mmol/L


 


Carbon Dioxide   31   (21-32)  mmol/L


 


Anion Gap   8.4   (5.0-14.0)  mmol/L


 


BUN   28 H   (7-18)  mg/dL


 


Creatinine   1.7 H   (0.8-1.3)  mg/dL


 


Est Cr Clr Drug Dosing   35.87   mL/min


 


Estimated GFR (MDRD)   39 L   (>60)  


 


Glucose   284 H   ()  mg/dL


 


POC Glucose    251 H  ()  mg/dL


 


Calcium   8.9   (8.5-10.1)  mg/dL


 


Total Bilirubin   0.7   (0.2-1.0)  mg/dL


 


AST   29   (15-37)  U/L


 


ALT   29   (12-78)  U/L


 


Alkaline Phosphatase   82   ()  U/L


 


Total Protein   6.5   (6.4-8.2)  g/dL


 


Albumin   3.5   (3.4-5.0)  g/dL


 


Globulin   3.0   (2.3-3.5)  g/dL


 


Albumin/Globulin Ratio   1.2   (1.2-2.2)  











Result Diagrams: 


                                 12/30/21 09:41





                                 12/30/21 09:41





Sepsis Event Note





- Evaluation


Sepsis Screening Result: No Definite Risk





- Focused Exam


Vital Signs: 


                                   Vital Signs











  Temp Pulse Pulse Resp BP BP Pulse Ox


 


 12/30/21 15:00  96.1 F L    16   113/64 


 


 12/30/21 10:49  96.6 F L   96  18   117/65  97


 


 12/30/21 08:46   99    112/74  


 


 12/30/21 08:01  96.4 F L   99  18   112/74  94 L














- Problem List Review


Problem List Initiated/Reviewed/Updated: Yes





- My Orders


Last 24 Hours: 


My Active Orders





12/30/21 16:30


GLUCOSE POC LAB TO COLLECT JPM [POC] QIDACANDBED 





12/30/21 21:00


GLUCOSE POC LAB TO COLLECT JPM [POC] QIDACANDBED 














- Plan


Plan:: 





Assessment/Plan:  #1.  CHF with fluid retention.  Output in the last 24 is 

improved and creat is 1.7. Will continue  IV fluids to improve kidney function. 

 





#2.  DMII.  Will adjust and treat blood sugars.  Elevated this morning after 

fruit.





#3.  Arthrosclerosis arteries diffuse.  Will check arterial flow in the left leg

 CTA today.  There is severe disease of the left leg of the arteries.  I have 

made a referral to see Dr. Napier in [facility] a vascular specialist for treatment 

of the left leg Wednesday 1/5/22 at 2 pm.  Home when a ride is available.  Will 

continue with therapy until dischargae.





#4.  ASHD:  Chronic





#5.  BKA right leg.





#6.  History of HTN:





#7.  History of HLD

## 2021-12-31 VITALS — SYSTOLIC BLOOD PRESSURE: 113 MMHG | DIASTOLIC BLOOD PRESSURE: 85 MMHG

## 2021-12-31 VITALS — HEART RATE: 91 BPM

## 2021-12-31 RX ADMIN — GLIPIZIDE SCH MG: 5 TABLET, FILM COATED, EXTENDED RELEASE ORAL at 07:23

## 2022-01-01 NOTE — PCM.PN
- General Info


Date of Service: 12/31/21


Functional Status: Reports: Pain Controlled





- Review of Systems


General: Reports: Weakness


HEENT: Reports: No Symptoms


Pulmonary: Reports: Shortness of Breath, Cough


Cardiovascular: Reports: Dyspnea on Exertion, Edema


Gastrointestinal: Reports: No Symptoms


Genitourinary: Reports: No Symptoms


Musculoskeletal: Reports: Leg Pain, Foot Pain


Skin: Reports: No Symptoms


Neurological: Reports: Difficulty Walking, Weakness, Gait Disturbance





- Patient Data


Vitals - Most Recent: 


                                Last Vital Signs











Temp  95.6 F L  12/31/21 07:02


 


Pulse  91   12/31/21 10:02


 


Resp  18   12/31/21 07:02


 


BP  113/85   12/31/21 10:02


 


Pulse Ox  90 L  12/31/21 07:02











Weight - Most Recent: 195 lb 8.8 oz


Med Orders - Current: 


                               Current Medications








Discontinued Medications





Acetaminophen/Codeine Phosphate (Acetaminophen/Codeine 300-30 Mg Tab)  1 tab PO 

Q6H PRN


   PRN Reason: Pain


   Last Admin: 12/27/21 08:03 Dose:  1 tab


   Documented by: 


Aspirin (Aspirin 81 Mg Tab.Ec)  81 mg PO DAILY [facility]


   Last Admin: 12/31/21 10:03 Dose:  81 mg


   Documented by: 


Aspirin (Aspirin 325 Mg Tab.Ec)  325 mg PO ONETIME ONE


   Stop: 12/27/21 15:31


   Last Admin: 12/27/21 15:35 Dose:  325 mg


   Documented by: 


Atenolol (Atenolol 25 Mg Tab)  25 mg PO DAILY [facility]


   Last Admin: 12/31/21 10:02 Dose:  25 mg


   Documented by: 


Bacitracin (Bacitracin Oint 28.35 Gm Tube)  0 gm TOP BID [facility]


   Last Admin: 12/31/21 10:03 Dose:  1 applic


   Documented by: 


Ceftriaxone Sodium (Ceftriaxone 1 Gm Advvial) Confirm Administered Dose 2 gm IV 

.STK-MED ONE


   Stop: 12/24/21 20:59


   Last Admin: 12/24/21 21:06 Dose:  Not Given


   Documented by: 


Diphenhydramine HCl (Diphenhydramine 50 Mg/Ml Sdv)  25 mg IVPUSH ONETIME ONE


   Stop: 12/24/21 20:01


   Last Admin: 12/24/21 20:39 Dose:  25 mg


   Documented by: 


Diphenhydramine HCl (Diphenhydramine 50 Mg/Ml Sdv)  25 mg IVPUSH ONETIME ONE


   Stop: 12/25/21 08:01


Diphenhydramine HCl (Diphenhydramine 50 Mg/Ml Sdv)  50 mg IV ONETIME ONE


   Stop: 12/29/21 14:01


   Last Admin: 12/29/21 13:44 Dose:  50 mg


   Documented by: 


Enoxaparin Sodium (Enoxaparin 30 Mg/0.3 Ml Syringe)  30 mg SUBCUT Q24H [facility]


   Last Admin: 12/27/21 09:06 Dose:  30 mg


   Documented by: 


Enoxaparin Sodium (Enoxaparin 40 Mg/0.4 Ml Syringe)  40 mg SUBCUT Q24H [facility]


   Last Admin: 12/31/21 10:03 Dose:  40 mg


   Documented by: 


Furosemide (Furosemide 20 Mg/2 Ml Vial)  20 mg IVPUSH ONETIME ONE


   Stop: 12/23/21 21:11


   Last Admin: 12/23/21 22:01 Dose:  20 mg


   Documented by: 


Furosemide (Furosemide 20 Mg/2 Ml Vial)  20 mg IVPUSH Q6H [facility]


   Last Admin: 12/25/21 00:02 Dose:  20 mg


   Documented by: 


Furosemide (Furosemide 20 Mg Tab)  20 mg PO ONETIME ONE


   Stop: 12/27/21 16:31


   Last Admin: 12/27/21 16:11 Dose:  20 mg


   Documented by: 


Furosemide (Furosemide 20 Mg/2 Ml Vial)  20 mg IVPUSH ONETIME ONE


   Stop: 12/28/21 21:47


   Last Admin: 12/28/21 22:28 Dose:  20 mg


   Documented by: 


Furosemide (Furosemide 20 Mg Tab)  20 mg PO ONETIME ONE


   Stop: 12/29/21 08:36


   Last Admin: 12/29/21 09:40 Dose:  20 mg


   Documented by: 


Furosemide (Furosemide 20 Mg Tab)  20 mg PO ONETIME ONE


   Stop: 12/29/21 13:01


   Last Admin: 12/29/21 13:44 Dose:  20 mg


   Documented by: 


Glipizide (Glipizide 5 Mg Tab.Er)  10 mg PO BIDMEALS [facility]


   Last Admin: 12/31/21 07:23 Dose:  10 mg


   Documented by: 


Guaifenesin/Dextromethorphan (Guaifenesin/Dextromethorphan 100-10 Mg/5 Ml Soln 

10 Ml Cup)  10 ml PO Q4H PRN


   PRN Reason: Cough


   Last Admin: 12/31/21 00:21 Dose:  10 ml


   Documented by: 


Hydrochlorothiazide (Hydrochlorothiazide 25 Mg Tab)  25 mg PO DAILY [facility]


   Last Admin: 12/25/21 11:11 Dose:  Not Given


   Documented by: 


Hydrochlorothiazide (Hydrochlorothiazide 25 Mg Tab)  25 mg PO DAILY [facility]


   Last Admin: 12/31/21 10:03 Dose:  25 mg


   Documented by: 


Ceftriaxone Sodium 2 gm/ (Sodium Chloride)  50 mls @ 100 mls/hr IV ONETIME ONE


   Stop: 12/24/21 21:12


   Last Admin: 12/24/21 22:01 Dose:  Not Given


   Documented by: 


Ceftriaxone Sodium 1 gm/ (Sodium Chloride)  50 mls @ 100 mls/hr IV Q24H [facility]


   Last Admin: 12/30/21 20:46 Dose:  100 mls/hr


   Documented by: 


Ceftriaxone Sodium 2 gm/ (Sodium Chloride)  50 mls @ 100 mls/hr IV ONETIME ONE


   Stop: 12/24/21 22:29


   Last Admin: 12/24/21 22:14 Dose:  100 mls/hr


   Documented by: 


Sodium Chloride (Normal Saline)  1,000 mls @ 100 mls/hr IV ASDIRECTED [facility]


   Last Admin: 12/28/21 16:48 Dose:  100 mls/hr


   Documented by: 


Sodium Chloride (Normal Saline)  1,000 mls @ 75 mls/hr IV ASDIRECTED [facility]


   Last Admin: 12/29/21 20:21 Dose:  75 mls/hr


   Documented by: 


Sodium Chloride (Normal Saline)  100 mls @ 3 mls/sec IV ASDIRECTED [facility]


   Stop: 12/29/21 17:16


   Last Admin: 12/29/21 17:16 Dose:  3 mls/sec


   Documented by: 


Iopamidol (Iopamidol 755 Mg/Ml 100 Ml Bottle)  100 ml IV .AS DIRECTED [facility]


   Stop: 12/29/21 17:16


   Last Admin: 12/29/21 17:16 Dose:  100 ml


   Documented by: 


Lidocaine HCl (Lidocaine 2% Jelly 10 Ml Urojet)  10 ml MUCMEM ONETIME ONE


   Stop: 12/23/21 21:48


   Last Admin: 12/23/21 22:01 Dose:  10 ml


   Documented by: 


Methylprednisolone Sodium Succinate (Methylprednisolone Sodium Succinate 40 Mg/1

Ml Sdv)  80 mg IVPUSH ONETIME ONE


   Stop: 12/24/21 20:01


   Last Admin: 12/24/21 20:41 Dose:  80 mg


   Documented by: 


Methylprednisolone Sodium Succinate (Methylprednisolone Sodium Succinate 40 Mg/1

Ml Sdv)  80 mg IVPUSH ONETIME ONE


   Stop: 12/25/21 08:01


Methylprednisolone Sodium Succinate (Methylprednisolone Sodium Succinate 40 Mg/1

Ml Sdv)  40 mg IV Q4H LEAH


   Stop: 12/29/21 14:01


   Last Admin: 12/29/21 13:44 Dose:  40 mg


   Documented by: 


Metolazone (Metolazone 2.5 Mg Tab)  5 mg PO ONETIME ONE


   Stop: 12/23/21 21:49


   Last Admin: 12/23/21 22:37 Dose:  5 mg


   Documented by: 


Pantoprazole Sodium (Pantoprazole 40 Mg Tab.Cr)  40 mg PO BIDAC [facility]


   Last Admin: 12/31/21 07:23 Dose:  40 mg


   Documented by: 


Potassium Chloride (Potassium Chloride 20 Meq Tab.Er)  20 meq PO DAILY [facility]


   Last Admin: 12/29/21 08:13 Dose:  20 meq


   Documented by: 


Sodium Chloride (Sodium Chloride 0.9% 10 Ml Syringe)  10 ml FLUSH ASDIRECTED PRN


   PRN Reason: Keep Vein Open


   Last Admin: 12/29/21 17:16 Dose:  10 ml


   Documented by: 


Spironolactone (Spironolactone 25 Mg Tab)  25 mg PO DAILY [facility]


   Last Admin: 12/25/21 11:11 Dose:  Not Given


   Documented by: 


Spironolactone (Spironolactone 25 Mg Tab)  25 mg PO DAILY [facility]


   Last Admin: 12/31/21 10:03 Dose:  25 mg


   Documented by: 


Tramadol HCl (Tramadol 50 Mg Tab)  50 mg PO Q6H PRN


   PRN Reason: Pain


   Last Admin: 12/24/21 16:27 Dose:  50 mg


   Documented by: 











- Exam


Urinary Catheter Total Time: 7Days  14Hours


General: Alert, Oriented


HEENT: Pupils Equal, Pupils Reactive, EOMI, Mucous Membr. Moist/Pink


Neck: Supple


Lungs: Clear to Auscultation, Normal Respiratory Effort


Cardiovascular: Regular Rate, Regular Rhythm


GI/Abdominal Exam: Normal Bowel Sounds


 (Male) Exam: Testicular Tenderness (L), Other (edema)


Extremities: Pedal Edema, Leg Pain, Redness


Peripheral Pulses: 0: Posterior Tibial (L), Posterior Tibial (R), Dorsalis Pedis

(L), Dorsalis Pedis (R), 1+: Radial (L), Radial (R)


Skin: Warm, Dry


Wound/Incisions: Erythema Improving


Psy/Mental Status: Alert





- Patient Data


Result Diagrams: 


                                 12/30/21 09:41





                                 12/31/21 09:44





Sepsis Event Note





- Evaluation


Sepsis Screening Result: Possible Sepsis Risk





- Problem List Review


Problem List Initiated/Reviewed/Updated: Yes





- Plan


Plan:: 





Assessment/Plan:  #1.  CHF with fluid retention.  Output adequate.  Creat is 

1.8. K 3.5.  Will send for 20 esperanza of K daily to his home pharmacy.  





#2.  DMII.  Will adjust and treat blood sugars. 





#3.  Arthrosclerosis arteries diffuse.  Aarterial flow in the left leg is 

comprised with severe disease of the left leg of the arteries.  I have made a 

referral to see Dr. Napier in [facility] a vascular specialist for treatment of the 

left leg Wednesday 1/5/22 at 2 pm.  Home today.  





#4.  ASHD:  Chronic





#5.  BKA right leg.





#6.  History of HTN:





#7.  History of HLD

## 2022-01-01 NOTE — PCM.DCSUM1
**Discharge Summary





- Hospital Course


Diagnosis: Stroke: No





- Discharge Data


Discharge Date: 12/31/21


Discharge Disposition: Home, Self-Care 01


Condition: Poor





- Referral to Home Health


Primary Care Physician: 


Fab Greer Sr, MD








- Patient Summary/Data


Complications: Has fluid retension and CRF with arterial disease of the left 

leg.  He does have problem ambulation because of the DKA of the right leg.  We 

need to save his left leg if at all possible.  His blood sugars are borderline 

control during his hospital stay.


Consults: 


                                  Consultations





12/28/21 12:27


OT Evaluation and Treatment [CONS] Routine 


   Please Evaluate and Treat.


   OT Reason for Consult: weakness


   This query below is only for informational purposes and is not editable.


   Admission Diagnosis/Problem: CHF, Congestive heart failure


PT Evaluation and Treatment [CONS] Routine 


   Please Evaluate and Treat.


   PT Reason for Consult: weakness


   This query below is only for informational purposes and is not editable.


   Admission Diagnosis/Problem: CHF, Congestive heart failure











Recommended Follow-up Testing/Procedures: 





To see Dr. Napier in 5 days for a vascular evaluation.


Hospital Course: 





Fluid was removed but then went into renal failure.  We did get they renal 

function improves enough to do an evaluation of the arterial flow the left leg 

which is severely compromised.  The time of discharge his creatinine was 1.7 and

 his potassium was low.  He'll follow through with this with Dr. Johns in 5 d

ays and have given him potassium supplement.





- Patient Instructions


Diet: Heart Healthy Diet


Activity: As Tolerated





- Discharge Plan


*PRESCRIPTION DRUG MONITORING PROGRAM REVIEWED*: No


*COPY OF PRESCRIPTION DRUG MONITORING REPORT IN PATIENT VAL: No


Home Medications: 


                                    Home Meds





Aspirin 325 mg PO DAILY 09/08/16 [History]


atenoloL [Atenolol] 25 mg PO DAILY 09/08/16 [History]


glipiZIDE [Glucotrol XL] 10 mg PO BID 09/08/16 [History]


Lactobacillus Rhamnosus GG [Culturelle] 2 cap PO BID  cap 09/13/16 [Rx]


Furosemide [Lasix] 40 mg PO BID 12/23/21 [History]


Pantoprazole Sodium [Protonix] 40 mg PO BID 12/23/21 [History]


Spironolact/Hydrochlorothiazid [Spironolactone-Hctz 25-25 Tab] 1 tab PO DAILY 

12/23/21 [History]


atenoloL [Tenormin] 25 mg PO DAILY  tablet 12/31/21 [Rx]








Patient Handouts:  Fall Prevention in the Home, Adult, Easy-to-Read, Community-

Acquired Pneumonia, Adult, Easy-to-Read





- Discharge Summary/Plan Comment


DC Time >30 min.: Yes


Total # of Minutes for Discharge Time: 





60


Discharge Summary/Plan Comment: 





Assessment/Plan:  #1.  CHF with fluid retention.  Output adequate.  Creat is 

1.8. K 3.5.  Will send for 20 esperanza of K daily to his home pharmacy.  





#2.  DMII.  Will adjust and treat blood sugars. 





#3.  Arthrosclerosis arteries diffuse.  Aarterial flow in the left leg is 

comprised with severe disease of the left leg of the arteries.  I have made a 

referral to see Dr. Napier in [facility] a vascular specialist for treatment of the 

left leg Wednesday 1/5/22 at 2 pm.  Home today.  





#4.  ASHD:  Chronic





#5.  BKA right leg.





#6.  History of HTN:





#7.  History of HLD





- General Info


Functional Status: Reports: Pain Controlled





- Review of Systems


General: Reports: Weakness


HEENT: Reports: No Symptoms


Pulmonary: Reports: Shortness of Breath, Cough


Cardiovascular: Reports: Dyspnea on Exertion, Orthopnea, Edema


Gastrointestinal: Reports: No Symptoms


Genitourinary: Reports: No Symptoms


Musculoskeletal: Reports: Leg Pain, Foot Pain


Neurological: Reports: No Symptoms, Difficulty Walking, Weakness, Gait 

Disturbance


Psychiatric: Reports: No Symptoms





- Patient Data


Vitals - Most Recent: 


                                Last Vital Signs











Temp  95.6 F L  12/31/21 07:02


 


Pulse  91   12/31/21 10:02


 


Resp  18   12/31/21 07:02


 


BP  113/85   12/31/21 10:02


 


Pulse Ox  90 L  12/31/21 07:02











Weight - Most Recent: 195 lb 8.8 oz


Med Orders - Current: 


                               Current Medications








Discontinued Medications





Acetaminophen/Codeine Phosphate (Acetaminophen/Codeine 300-30 Mg Tab)  1 tab PO 

Q6H PRN


   PRN Reason: Pain


   Last Admin: 12/27/21 08:03 Dose:  1 tab


   Documented by: 


Aspirin (Aspirin 81 Mg Tab.Ec)  81 mg PO DAILY [facility]


   Last Admin: 12/31/21 10:03 Dose:  81 mg


   Documented by: 


Aspirin (Aspirin 325 Mg Tab.Ec)  325 mg PO ONETIME ONE


   Stop: 12/27/21 15:31


   Last Admin: 12/27/21 15:35 Dose:  325 mg


   Documented by: 


Atenolol (Atenolol 25 Mg Tab)  25 mg PO DAILY [facility]


   Last Admin: 12/31/21 10:02 Dose:  25 mg


   Documented by: 


Bacitracin (Bacitracin Oint 28.35 Gm Tube)  0 gm TOP BID [facility]


   Last Admin: 12/31/21 10:03 Dose:  1 applic


   Documented by: 


Ceftriaxone Sodium (Ceftriaxone 1 Gm Advvial) Confirm Administered Dose 2 gm IV 

.STK-MED ONE


   Stop: 12/24/21 20:59


   Last Admin: 12/24/21 21:06 Dose:  Not Given


   Documented by: 


Diphenhydramine HCl (Diphenhydramine 50 Mg/Ml Sdv)  25 mg IVPUSH ONETIME ONE


   Stop: 12/24/21 20:01


   Last Admin: 12/24/21 20:39 Dose:  25 mg


   Documented by: 


Diphenhydramine HCl (Diphenhydramine 50 Mg/Ml Sdv)  25 mg IVPUSH ONETIME ONE


   Stop: 12/25/21 08:01


Diphenhydramine HCl (Diphenhydramine 50 Mg/Ml Sdv)  50 mg IV ONETIME ONE


   Stop: 12/29/21 14:01


   Last Admin: 12/29/21 13:44 Dose:  50 mg


   Documented by: 


Enoxaparin Sodium (Enoxaparin 30 Mg/0.3 Ml Syringe)  30 mg SUBCUT Q24H [facility]


   Last Admin: 12/27/21 09:06 Dose:  30 mg


   Documented by: 


Enoxaparin Sodium (Enoxaparin 40 Mg/0.4 Ml Syringe)  40 mg SUBCUT Q24H [facility]


   Last Admin: 12/31/21 10:03 Dose:  40 mg


   Documented by: 


Furosemide (Furosemide 20 Mg/2 Ml Vial)  20 mg IVPUSH ONETIME ONE


   Stop: 12/23/21 21:11


   Last Admin: 12/23/21 22:01 Dose:  20 mg


   Documented by: 


Furosemide (Furosemide 20 Mg/2 Ml Vial)  20 mg IVPUSH Q6H [facility]


   Last Admin: 12/25/21 00:02 Dose:  20 mg


   Documented by: 


Furosemide (Furosemide 20 Mg Tab)  20 mg PO ONETIME ONE


   Stop: 12/27/21 16:31


   Last Admin: 12/27/21 16:11 Dose:  20 mg


   Documented by: 


Furosemide (Furosemide 20 Mg/2 Ml Vial)  20 mg IVPUSH ONETIME ONE


   Stop: 12/28/21 21:47


   Last Admin: 12/28/21 22:28 Dose:  20 mg


   Documented by: 


Furosemide (Furosemide 20 Mg Tab)  20 mg PO ONETIME ONE


   Stop: 12/29/21 08:36


   Last Admin: 12/29/21 09:40 Dose:  20 mg


   Documented by: 


Furosemide (Furosemide 20 Mg Tab)  20 mg PO ONETIME ONE


   Stop: 12/29/21 13:01


   Last Admin: 12/29/21 13:44 Dose:  20 mg


   Documented by: 


Glipizide (Glipizide 5 Mg Tab.Er)  10 mg PO BIDMEALS [facility]


   Last Admin: 12/31/21 07:23 Dose:  10 mg


   Documented by: 


Guaifenesin/Dextromethorphan (Guaifenesin/Dextromethorphan 100-10 Mg/5 Ml Soln 

10 Ml Cup)  10 ml PO Q4H PRN


   PRN Reason: Cough


   Last Admin: 12/31/21 00:21 Dose:  10 ml


   Documented by: 


Hydrochlorothiazide (Hydrochlorothiazide 25 Mg Tab)  25 mg PO DAILY [facility]


   Last Admin: 12/25/21 11:11 Dose:  Not Given


   Documented by: 


Hydrochlorothiazide (Hydrochlorothiazide 25 Mg Tab)  25 mg PO DAILY [facility]


   Last Admin: 12/31/21 10:03 Dose:  25 mg


   Documented by: 


Ceftriaxone Sodium 2 gm/ (Sodium Chloride)  50 mls @ 100 mls/hr IV ONETIME ONE


   Stop: 12/24/21 21:12


   Last Admin: 12/24/21 22:01 Dose:  Not Given


   Documented by: 


Ceftriaxone Sodium 1 gm/ (Sodium Chloride)  50 mls @ 100 mls/hr IV Q24H [facility]


   Last Admin: 12/30/21 20:46 Dose:  100 mls/hr


   Documented by: 


Ceftriaxone Sodium 2 gm/ (Sodium Chloride)  50 mls @ 100 mls/hr IV ONETIME ONE


   Stop: 12/24/21 22:29


   Last Admin: 12/24/21 22:14 Dose:  100 mls/hr


   Documented by: 


Sodium Chloride (Normal Saline)  1,000 mls @ 100 mls/hr IV ASDIRECTED [facility]


   Last Admin: 12/28/21 16:48 Dose:  100 mls/hr


   Documented by: 


Sodium Chloride (Normal Saline)  1,000 mls @ 75 mls/hr IV ASDIRECTED [facility]


   Last Admin: 12/29/21 20:21 Dose:  75 mls/hr


   Documented by: 


Sodium Chloride (Normal Saline)  100 mls @ 3 mls/sec IV ASDIRECTED [facility]


   Stop: 12/29/21 17:16


   Last Admin: 12/29/21 17:16 Dose:  3 mls/sec


   Documented by: 


Iopamidol (Iopamidol 755 Mg/Ml 100 Ml Bottle)  100 ml IV .AS DIRECTED [facility]


   Stop: 12/29/21 17:16


   Last Admin: 12/29/21 17:16 Dose:  100 ml


   Documented by: 


Lidocaine HCl (Lidocaine 2% Jelly 10 Ml Urojet)  10 ml MUCMEM ONETIME ONE


   Stop: 12/23/21 21:48


   Last Admin: 12/23/21 22:01 Dose:  10 ml


   Documented by: 


Methylprednisolone Sodium Succinate (Methylprednisolone Sodium Succinate 40 Mg/1

 Ml Sdv)  80 mg IVPUSH ONETIME ONE


   Stop: 12/24/21 20:01


   Last Admin: 12/24/21 20:41 Dose:  80 mg


   Documented by: 


Methylprednisolone Sodium Succinate (Methylprednisolone Sodium Succinate 40 Mg/1

 Ml Sdv)  80 mg IVPUSH ONETIME ONE


   Stop: 12/25/21 08:01


Methylprednisolone Sodium Succinate (Methylprednisolone Sodium Succinate 40 Mg/1

 Ml Sdv)  40 mg IV Q4H [facility]


   Stop: 12/29/21 14:01


   Last Admin: 12/29/21 13:44 Dose:  40 mg


   Documented by: 


Metolazone (Metolazone 2.5 Mg Tab)  5 mg PO ONETIME ONE


   Stop: 12/23/21 21:49


   Last Admin: 12/23/21 22:37 Dose:  5 mg


   Documented by: 


Pantoprazole Sodium (Pantoprazole 40 Mg Tab.Cr)  40 mg PO BIDAC [facility]


   Last Admin: 12/31/21 07:23 Dose:  40 mg


   Documented by: 


Potassium Chloride (Potassium Chloride 20 Meq Tab.Er)  20 meq PO DAILY [facility]


   Last Admin: 12/29/21 08:13 Dose:  20 meq


   Documented by: 


Sodium Chloride (Sodium Chloride 0.9% 10 Ml Syringe)  10 ml FLUSH ASDIRECTED PRN


   PRN Reason: Keep Vein Open


   Last Admin: 12/29/21 17:16 Dose:  10 ml


   Documented by: 


Spironolactone (Spironolactone 25 Mg Tab)  25 mg PO DAILY [facility]


   Last Admin: 12/25/21 11:11 Dose:  Not Given


   Documented by: 


Spironolactone (Spironolactone 25 Mg Tab)  25 mg PO DAILY [facility]


   Last Admin: 12/31/21 10:03 Dose:  25 mg


   Documented by: 


Tramadol HCl (Tramadol 50 Mg Tab)  50 mg PO Q6H PRN


   PRN Reason: Pain


   Last Admin: 12/24/21 16:27 Dose:  50 mg


   Documented by: 











- Exam


General: Reports: Alert, Oriented


HEENT: Reports: Pupils Equal, Pupils Reactive, EOMI, Mucous Membr. Moist/Pink


Neck: Reports: Supple


Lungs: Reports: Clear to Auscultation, Normal Respiratory Effort


Cardiovascular: Reports: Regular Rate, Regular Rhythm


GI/Abdominal Exam: Soft


 (Male) Exam: Other (edema)


Extremities: Leg Pain, Redness


Skin: Reports: Warm, Dry


Psy/Mental Status: Reports: Alert, Anxious

## 2022-05-02 NOTE — PCM.PN
- General Info


Date of Service: 12/28/21


Functional Status: Reports: Pain Controlled





- Review of Systems


General: Reports: Weakness


HEENT: Reports: No Symptoms


Pulmonary: Reports: Shortness of Breath


Cardiovascular: Reports: No Symptoms


Gastrointestinal: Reports: No Symptoms


Genitourinary: Reports: No Symptoms


Musculoskeletal: Reports: Leg Pain


Skin: Reports: No Symptoms


Psychiatric: Reports: No Symptoms





- Patient Data


Vitals - Most Recent: 


                                Last Vital Signs











Temp  95.1 F L  12/28/21 10:58


 


Pulse  74   12/28/21 10:58


 


Resp  18   12/28/21 10:58


 


BP  121/71   12/28/21 10:58


 


Pulse Ox  100   12/28/21 10:58











Weight - Most Recent: 200 lb 6.403 oz


I&O - Last 24 Hours: 


                                 Intake & Output











 12/27/21 12/28/21 12/28/21





 22:59 06:59 14:59


 


Intake Total 1145 735 400


 


Output Total 750 1350 


 


Balance 395 -615 400











Lab Results Last 24 Hours: 


                         Laboratory Results - last 24 hr











  12/27/21 12/27/21 12/28/21 Range/Units





  16:23 20:57 04:20 


 


Sodium    143  (140-148)  mmol/L


 


Potassium    3.4 L  (3.6-5.2)  mmol/L


 


Chloride    103  (100-108)  mmol/L


 


Carbon Dioxide    31  (21-32)  mmol/L


 


Anion Gap    12.4  (5.0-14.0)  mmol/L


 


BUN    33 H  (7-18)  mg/dL


 


Creatinine    1.7 H  (0.8-1.3)  mg/dL


 


Est Cr Clr Drug Dosing    35.78  mL/min


 


Estimated GFR (MDRD)    39 L  (>60)  


 


Glucose    163 H  ()  mg/dL


 


POC Glucose  121 H  219 H   ()  mg/dL


 


Calcium    8.4 L  (8.5-10.1)  mg/dL














  12/28/21 12/28/21 Range/Units





  07:14 11:14 


 


Sodium    (140-148)  mmol/L


 


Potassium    (3.6-5.2)  mmol/L


 


Chloride    (100-108)  mmol/L


 


Carbon Dioxide    (21-32)  mmol/L


 


Anion Gap    (5.0-14.0)  mmol/L


 


BUN    (7-18)  mg/dL


 


Creatinine    (0.8-1.3)  mg/dL


 


Est Cr Clr Drug Dosing    mL/min


 


Estimated GFR (MDRD)    (>60)  


 


Glucose    ()  mg/dL


 


POC Glucose  135 H  146 H  ()  mg/dL


 


Calcium    (8.5-10.1)  mg/dL











Med Orders - Current: 


                               Current Medications





Acetaminophen/Codeine Phosphate (Acetaminophen/Codeine 300-30 Mg Tab)  1 tab PO 

Q6H PRN


   PRN Reason: Pain


   Last Admin: 12/27/21 08:03 Dose:  1 tab


   Documented by: 


Aspirin (Aspirin 81 Mg Tab.Ec)  81 mg PO DAILY [facility]


   Last Admin: 12/28/21 08:08 Dose:  81 mg


   Documented by: 


Atenolol (Atenolol 25 Mg Tab)  25 mg PO DAILY [facility]


   Last Admin: 12/28/21 09:31 Dose:  Not Given


   Documented by: 


Bacitracin (Bacitracin Oint 28.35 Gm Tube)  0 gm TOP BID [facility]


   Last Admin: 12/28/21 08:08 Dose:  1 applic


   Documented by: 


Enoxaparin Sodium (Enoxaparin 40 Mg/0.4 Ml Syringe)  40 mg SUBCUT Q24H [facility]


   Last Admin: 12/28/21 09:38 Dose:  40 mg


   Documented by: 


Glipizide (Glipizide 5 Mg Tab.Er)  10 mg PO BIDMEALS [facility]


   Last Admin: 12/28/21 08:07 Dose:  10 mg


   Documented by: 


Guaifenesin/Dextromethorphan (Guaifenesin/Dextromethorphan 100-10 Mg/5 Ml Soln 

10 Ml Cup)  10 ml PO Q4H PRN


   PRN Reason: Cough


Hydrochlorothiazide (Hydrochlorothiazide 25 Mg Tab)  25 mg PO DAILY [facility]


   Last Admin: 12/28/21 08:07 Dose:  25 mg


   Documented by: 


Ceftriaxone Sodium 1 gm/ (Sodium Chloride)  50 mls @ 100 mls/hr IV Q24H [facility]


   Last Admin: 12/27/21 21:08 Dose:  100 mls/hr


   Documented by: 


Sodium Chloride (Normal Saline)  1,000 mls @ 100 mls/hr IV ASDIRECTED [facility]


   Last Admin: 12/28/21 06:03 Dose:  100 mls/hr


   Documented by: 


Pantoprazole Sodium (Pantoprazole 40 Mg Tab.Cr)  40 mg PO BIDAC [facility]


   Last Admin: 12/28/21 08:08 Dose:  40 mg


   Documented by: 


Potassium Chloride (Potassium Chloride 20 Meq Tab.Er)  20 meq PO DAILY [facility]


Sodium Chloride (Sodium Chloride 0.9% 10 Ml Syringe)  10 ml FLUSH ASDIRECTED PRN


   PRN Reason: Keep Vein Open


Spironolactone (Spironolactone 25 Mg Tab)  25 mg PO DAILY [facility]


   Last Admin: 12/28/21 08:07 Dose:  25 mg


   Documented by: 


Tramadol HCl (Tramadol 50 Mg Tab)  50 mg PO Q6H PRN


   PRN Reason: Pain


   Last Admin: 12/24/21 16:27 Dose:  50 mg


   Documented by: 





Discontinued Medications





Aspirin (Aspirin 325 Mg Tab.Ec)  325 mg PO ONETIME ONE


   Stop: 12/27/21 15:31


   Last Admin: 12/27/21 15:35 Dose:  325 mg


   Documented by: 


Ceftriaxone Sodium (Ceftriaxone 1 Gm Advvial) Confirm Administered Dose 2 gm IV 

.STK-MED ONE


   Stop: 12/24/21 20:59


   Last Admin: 12/24/21 21:06 Dose:  Not Given


   Documented by: 


Diphenhydramine HCl (Diphenhydramine 50 Mg/Ml Sdv)  25 mg IVPUSH ONETIME ONE


   Stop: 12/24/21 20:01


   Last Admin: 12/24/21 20:39 Dose:  25 mg


   Documented by: 


Diphenhydramine HCl (Diphenhydramine 50 Mg/Ml Sdv)  25 mg IVPUSH ONETIME ONE


   Stop: 12/25/21 08:01


Enoxaparin Sodium (Enoxaparin 30 Mg/0.3 Ml Syringe)  30 mg SUBCUT Q24H [facility]


   Last Admin: 12/27/21 09:06 Dose:  30 mg


   Documented by: 


Furosemide (Furosemide 20 Mg/2 Ml Vial)  20 mg IVPUSH ONETIME ONE


   Stop: 12/23/21 21:11


   Last Admin: 12/23/21 22:01 Dose:  20 mg


   Documented by: 


Furosemide (Furosemide 20 Mg/2 Ml Vial)  20 mg IVPUSH Q6H [facility]


   Last Admin: 12/25/21 00:02 Dose:  20 mg


   Documented by: 


Furosemide (Furosemide 20 Mg Tab)  20 mg PO ONETIME ONE


   Stop: 12/27/21 16:31


   Last Admin: 12/27/21 16:11 Dose:  20 mg


   Documented by: 


Hydrochlorothiazide (Hydrochlorothiazide 25 Mg Tab)  25 mg PO DAILY [facility]


   Last Admin: 12/25/21 11:11 Dose:  Not Given


   Documented by: 


Ceftriaxone Sodium 2 gm/ (Sodium Chloride)  50 mls @ 100 mls/hr IV ONETIME ONE


   Stop: 12/24/21 21:12


   Last Admin: 12/24/21 22:01 Dose:  Not Given


   Documented by: 


Ceftriaxone Sodium 2 gm/ (Sodium Chloride)  50 mls @ 100 mls/hr IV ONETIME ONE


   Stop: 12/24/21 22:29


   Last Admin: 12/24/21 22:14 Dose:  100 mls/hr


   Documented by: 


Lidocaine HCl (Lidocaine 2% Jelly 10 Ml Urojet)  10 ml MUCMEM ONETIME ONE


   Stop: 12/23/21 21:48


   Last Admin: 12/23/21 22:01 Dose:  10 ml


   Documented by: 


Methylprednisolone Sodium Succinate (Methylprednisolone Sodium Succinate 40 Mg/1

Ml Sdv)  80 mg IVPUSH ONETIME ONE


   Stop: 12/24/21 20:01


   Last Admin: 12/24/21 20:41 Dose:  80 mg


   Documented by: 


Methylprednisolone Sodium Succinate (Methylprednisolone Sodium Succinate 40 Mg/1

Ml Sdv)  80 mg IVPUSH ONETIME ONE


   Stop: 12/25/21 08:01


Metolazone (Metolazone 2.5 Mg Tab)  5 mg PO ONETIME ONE


   Stop: 12/23/21 21:49


   Last Admin: 12/23/21 22:37 Dose:  5 mg


   Documented by: 


Spironolactone (Spironolactone 25 Mg Tab)  25 mg PO DAILY [facility]


   Last Admin: 12/25/21 11:11 Dose:  Not Given


   Documented by: 











- Exam


Urinary Catheter Total Time: 4Days  10Hours


General: Alert, Oriented


HEENT: Pupils Equal, Pupils Reactive, EOMI, Mucous Membr. Moist/Pink


Neck: Supple


Lungs: Clear to Auscultation, Normal Respiratory Effort


Cardiovascular: Regular Rate, Regular Rhythm


GI/Abdominal Exam: Normal Bowel Sounds, Soft, Non-Tender, No Organomegaly, No 

Distention, No Abnormal Bruit, No Mass, Pelvis Stable


Extremities: Pedal Edema


Peripheral Pulses: 1+: Radial (L), Radial (R)


Skin: Warm, Dry, Intact


Neurological: No New Focal Deficit, Other (unable to walk as he has weakness and

O2 drops.)





- Patient Data


Lab Results Last 24 hrs: 


                         Laboratory Results - last 24 hr











  12/27/21 12/27/21 12/28/21 Range/Units





  16:23 20:57 04:20 


 


Sodium    143  (140-148)  mmol/L


 


Potassium    3.4 L  (3.6-5.2)  mmol/L


 


Chloride    103  (100-108)  mmol/L


 


Carbon Dioxide    31  (21-32)  mmol/L


 


Anion Gap    12.4  (5.0-14.0)  mmol/L


 


BUN    33 H  (7-18)  mg/dL


 


Creatinine    1.7 H  (0.8-1.3)  mg/dL


 


Est Cr Clr Drug Dosing    35.78  mL/min


 


Estimated GFR (MDRD)    39 L  (>60)  


 


Glucose    163 H  ()  mg/dL


 


POC Glucose  121 H  219 H   ()  mg/dL


 


Calcium    8.4 L  (8.5-10.1)  mg/dL














  12/28/21 12/28/21 Range/Units





  07:14 11:14 


 


Sodium    (140-148)  mmol/L


 


Potassium    (3.6-5.2)  mmol/L


 


Chloride    (100-108)  mmol/L


 


Carbon Dioxide    (21-32)  mmol/L


 


Anion Gap    (5.0-14.0)  mmol/L


 


BUN    (7-18)  mg/dL


 


Creatinine    (0.8-1.3)  mg/dL


 


Est Cr Clr Drug Dosing    mL/min


 


Estimated GFR (MDRD)    (>60)  


 


Glucose    ()  mg/dL


 


POC Glucose  135 H  146 H  ()  mg/dL


 


Calcium    (8.5-10.1)  mg/dL











Result Diagrams: 


                                 12/25/21 04:10





                                 12/28/21 04:20





Sepsis Event Note





- Evaluation


Sepsis Screening Result: No Definite Risk





- Focused Exam


Vital Signs: 


                                   Vital Signs











  Temp Pulse Resp BP Pulse Ox


 


 12/28/21 10:58  95.1 F L  74  18  121/71  100


 


 12/28/21 07:00  95.6 F L  80  18  100/66  95


 


 12/28/21 03:32  96.6 F L  74  20  105/66  97














- Problem List Review


Problem List Initiated/Reviewed/Updated: Yes





- My Orders


Last 24 Hours: 


My Active Orders





12/27/21 16:00


Sodium Chloride 0.9% [Normal Saline] 1,000 ml IV ASDIRECTED 





12/27/21 17:00


Spironolactone [Aldactone]   25 mg PO DAILY 


hydroCHLOROthiazide   25 mg PO DAILY 





12/28/21 10:00


Enoxaparin [Lovenox]   40 mg SUBCUT Q24H 





12/28/21 12:27


OT Evaluation and Treatment [CONS] Routine 


PT Evaluation and Treatment [CONS] Routine 





12/28/21 14:45


Potassium Chloride [Klor-Con M20]   20 meq PO DAILY 





12/28/21 16:30


GLUCOSE POC LAB TO COLLECT JPM [POC] QIDACANDBED 





12/28/21 21:00


GLUCOSE POC LAB TO COLLECT JPM [POC] QIDACANDBED 





12/29/21 07:30


GLUCOSE POC LAB TO COLLECT JPM [POC] QIDACANDBED 





12/29/21 11:30


GLUCOSE POC LAB TO COLLECT JPM [POC] QIDACANDBED 





12/29/21 16:30


GLUCOSE POC LAB TO COLLECT JPM [POC] QIDACANDBED 





12/29/21 21:00


GLUCOSE POC LAB TO COLLECT JPM [POC] QIDACANDBED 





12/30/21 07:30


GLUCOSE POC LAB TO COLLECT JPM [POC] QIDACANDBED 





12/30/21 11:30


GLUCOSE POC LAB TO COLLECT JPM [POC] QIDACANDBED 





12/30/21 16:30


GLUCOSE POC LAB TO COLLECT JPM [POC] QIDACANDBED 





12/30/21 21:00


GLUCOSE POC LAB TO COLLECT JPM [POC] QIDACANDBED 














- Plan


Plan:: 





Assessment/Plan:  #1.  CHF with fluid retention.  Output in the last 24 is 

improved and creat is 1.7 from 2.0. Will continue  IV fluids to improve kidney 

functioin.  





#2.  DMII.  Will adjust and treat blood sugars.  Elevated this morning after 

fruit.





#3.  Arthrosclerosis arteries diffuse.  Will check arterial flow in the left 

leg.





#4.  ASHD:  Chronic





#5.  BKA right leg.]





#6.  History of HTN:





#7.  History of HLD Detail Level: Simple Detail Level: Generalized Hide Additional Notes?: No Detail Level: Detailed Include Location In Plan?: Yes